# Patient Record
Sex: FEMALE | Race: WHITE | Employment: OTHER | URBAN - METROPOLITAN AREA
[De-identification: names, ages, dates, MRNs, and addresses within clinical notes are randomized per-mention and may not be internally consistent; named-entity substitution may affect disease eponyms.]

---

## 2020-12-03 ENCOUNTER — OFFICE VISIT (OUTPATIENT)
Dept: UROGYNECOLOGY | Age: 70
End: 2020-12-03
Payer: MEDICARE

## 2020-12-03 VITALS
OXYGEN SATURATION: 97 % | DIASTOLIC BLOOD PRESSURE: 84 MMHG | HEART RATE: 73 BPM | SYSTOLIC BLOOD PRESSURE: 149 MMHG | TEMPERATURE: 97 F | RESPIRATION RATE: 16 BRPM

## 2020-12-03 LAB
BILIRUBIN, POC: NORMAL
BLOOD URINE, POC: NORMAL
CLARITY, POC: CLEAR
COLOR, POC: YELLOW
EMPTY COUGH STRESS TEST: NORMAL
FIRST SENSATION: 90 CC
FULL COUGH STRESS TEST: NORMAL
GLUCOSE URINE, POC: NORMAL
KETONES, POC: NORMAL
LEUKOCYTE EST, POC: NORMAL
MAX SENSATION: 190 CC
NITRATE, URINE POC: NORMAL
NITRITE, POC: NORMAL
PH, POC: 6.5
POST VOID RESIDUAL (PVR): 10 ML
PROTEIN, POC: NORMAL
RBC URINE, POC: NORMAL
SECOND SENSATION: 130 CC
SPASM: NORMAL
SPECIFIC GRAVITY, POC: 1.01
UROBILINOGEN, POC: NORMAL
WBC URINE, POC: NORMAL

## 2020-12-03 PROCEDURE — 99204 OFFICE O/P NEW MOD 45 MIN: CPT | Performed by: OBSTETRICS & GYNECOLOGY

## 2020-12-03 PROCEDURE — G8427 DOCREV CUR MEDS BY ELIG CLIN: HCPCS | Performed by: OBSTETRICS & GYNECOLOGY

## 2020-12-03 PROCEDURE — G8484 FLU IMMUNIZE NO ADMIN: HCPCS | Performed by: OBSTETRICS & GYNECOLOGY

## 2020-12-03 PROCEDURE — 51725 SIMPLE CYSTOMETROGRAM: CPT | Performed by: OBSTETRICS & GYNECOLOGY

## 2020-12-03 PROCEDURE — 1090F PRES/ABSN URINE INCON ASSESS: CPT | Performed by: OBSTETRICS & GYNECOLOGY

## 2020-12-03 PROCEDURE — G8421 BMI NOT CALCULATED: HCPCS | Performed by: OBSTETRICS & GYNECOLOGY

## 2020-12-03 PROCEDURE — 81002 URINALYSIS NONAUTO W/O SCOPE: CPT | Performed by: OBSTETRICS & GYNECOLOGY

## 2020-12-03 RX ORDER — LEVOTHYROXINE SODIUM 88 UG/1
88 TABLET ORAL DAILY
COMMUNITY
Start: 2020-10-05

## 2020-12-03 ASSESSMENT — ENCOUNTER SYMPTOMS
DIARRHEA: 1
CONSTIPATION: 1
ABDOMINAL DISTENTION: 1
EYE ITCHING: 1
PHOTOPHOBIA: 1
ABDOMINAL PAIN: 1

## 2020-12-03 NOTE — PROGRESS NOTES
12/3/2020      HPI:     Name: Kimi Perea  YOB: 1950    CC: Patient is a 65 y.o. female who is seen in consultation from Salvador East MD   for evaluation of Rectocele and vaginal Prolapse. HPI:  She has pelvic organ prolapse that extends outside the opening of the vagina. She previously had a hysterectomy  Bladder control problem: yes, Only when she waits too long to urinate, loses urine when coughing, sneezing, running, or lifting. Bladder emptying problems: yes, 1 year. Thinks she has finished then has go back to bathroom in 5 minutes. Prolapse/Vaginal Support problems: yes, 8 years, occasionally has to push it back in herself. Bowel problem(s): yes,1 year, has accidental loss of gas and stool. Has to wear pads and change them several time a day. Sometimes has to manually assist with bowel movement. Sexual History:  reports being sexually active and has had partner(s) who are Male. Pelvic Pain:  yes, 1-2 years in all pelvic and rectal areas, sometimes walking hellps with pain. Ob/Gyn History:    OB History   No obstetric history on file. Past Medical History:   Past Medical History:   Diagnosis Date    Cancer [facility])     removal of a cancerous node from left thigh    Thyroid disease     underactive     Past Surgical History:   Past Surgical History:   Procedure Laterality Date    APPENDECTOMY      BREAST SURGERY      cyst benign    CHOLECYSTECTOMY      HYSTERECTOMY  1990    fibroids, excessive bleeding    OVARY REMOVAL      TONSILLECTOMY  1954    TUBAL LIGATION       Allergies:    Allergies   Allergen Reactions    Ceclor [Cefaclor]     Sulfa Antibiotics      Current Medications:  Current Outpatient Medications   Medication Sig Dispense Refill    levothyroxine (SYNTHROID) 88 MCG tablet       NONFORMULARY Revver capsule once a day      conjugated estrogens (PREMARIN) 0.625 MG/GM vaginal cream Apply pea size amount of cream with index finger to the vagina two times per week 42.5 g 0     No current facility-administered medications for this visit. Social History:   Social History     Socioeconomic History    Marital status:      Spouse name: Not on file    Number of children: Not on file    Years of education: Not on file    Highest education level: Not on file   Occupational History    Occupation: retired     Comment: educator   Social Needs    Financial resource strain: Not on file    Food insecurity     Worry: Not on file     Inability: Not on file   [employer] needs     Medical: Not on file     Non-medical: Not on file   Tobacco Use    Smoking status: Never Smoker    Smokeless tobacco: Never Used   Substance and Sexual Activity    Alcohol use: Yes     Comment: occasionally    Drug use: Never    Sexual activity: Yes     Partners: Male   Lifestyle    Physical activity     Days per week: Not on file     Minutes per session: Not on file    Stress: Not on file   Relationships    Social connections     Talks on phone: Not on file     Gets together: Not on file     Attends Advent service: Not on file     Active member of club or organization: Not on file     Attends meetings of clubs or organizations: Not on file     Relationship status: Not on file    Intimate partner violence     Fear of current or ex partner: Not on file     Emotionally abused: Not on file     Physically abused: Not on file     Forced sexual activity: Not on file   Other Topics Concern    Not on file   Social History Narrative    Not on file     Family History:   Family History   Problem Relation Age of Onset    Heart Failure Father     Diabetes Father     Cancer Maternal Aunt         gones    Breast Cancer Maternal Aunt     Diabetes Maternal Great Grandmother     Other Sister         posterior cortical atrophy similar to alzheimer's     Review of System  Review of Systems   HENT: Positive for dental problem, ear pain and tinnitus.     Eyes: Positive for photophobia and itching. Light sensitivity   Gastrointestinal: Positive for abdominal distention, abdominal pain, constipation and diarrhea. Genitourinary: Positive for dyspareunia and vaginal pain. Musculoskeletal: Positive for neck stiffness. Allergic/Immunologic: Positive for environmental allergies. Psychiatric/Behavioral: Positive for sleep disturbance. All other systems reviewed and are negative. A review of systems was done by the patient and reviewed by me and scanned into media today. Objective:     Vital Signs  Vitals:    12/03/20 0824   BP: (!) 149/84   Pulse: 73   Resp: 16   Temp: 97 °F (36.1 °C)   SpO2: 97%     Physical Exam  Physical Exam  HENT:      Head: Normocephalic and atraumatic. Eyes:      Conjunctiva/sclera: Conjunctivae normal.   Neck:      Musculoskeletal: Normal range of motion and neck supple. Pulmonary:      Effort: Pulmonary effort is normal.   Abdominal:      Palpations: Abdomen is soft. Genitourinary:     Comments: She has a large rectocele, vaginal atrophy, evidence of an old retropubic procedure  Skin:     General: Skin is warm and dry. Neurological:      Mental Status: She is alert and oriented to person, place, and time. Results for POC orders placed in visit on 12/03/20   POCT Urinalysis no Micro   Result Value Ref Range    Color, UA yellow     Clarity, UA clear     Glucose, UA POC neg     Bilirubin, UA neg     Ketones, UA neg     Spec Grav, UA 1.015     Blood, UA POC neg     pH, UA 6.5     Protein, UA POC neg     Urobilinogen, UA neg     Leukocytes, UA neg     Nitrite, UA neg        Office Fill Study/Urine Dip:     Using sterile technique a manometry catheter was placed. Patient's bladder was filled with sterile water by gravity. Capacity and storage volumes were measured. Spasms assessed. Catheter was removed. Stress urinary incontinence was assessed.     Results for POC orders placed in visit on 12/03/20   POCT Urinalysis no Micro   Result Value Ref Range    Color, UA yellow     Clarity, UA clear     Glucose, UA POC neg     Bilirubin, UA neg     Ketones, UA neg     Spec Grav, UA 1.015     Blood, UA POC neg     pH, UA 6.5     Protein, UA POC neg     Urobilinogen, UA neg     Leukocytes, UA neg     Nitrite, UA neg        Cystometrogram   wbc urine, poc   Date Value Ref Range Status   12/03/2020 neg  Final     rbc urine, poc   Date Value Ref Range Status   12/03/2020 neg  Final     Nitrate, UA POC   Date Value Ref Range Status   12/03/2020 neg  Final     post void residual   Date Value Ref Range Status   12/03/2020 10 ml Final     FIRST SENSATION   Date Value Ref Range Status   12/03/2020 90 cc Final     SECOND SENSATION   Date Value Ref Range Status   12/03/2020 130 cc Final     EMPTY COUGH STRESS TEST   Date Value Ref Range Status   12/03/2020 neg  Final     FULL COUGH STRESS TEST   Date Value Ref Range Status   12/03/2020 neg  Final     SPASM   Date Value Ref Range Status   12/03/2020 neg  Final        POPQ and Pelvic Exam:     Pelvic Organ Prolapse Quantification  Anterior Wall (Aa): 3   Anterior Wall (Ba): 3   Cervix or Cuff (C): -5     Genital Hiatus (gh): 3   Perineal Body (pb): 3   Total Vaginal Length (tvl): 8     Posterior Wall (Ap): +1   Posterior Wall (Bp): +1   No data recorded   Oxford Scale Muscle Strength: 0       Assessment/Plan:     Paula Willis is a 65 y.o. female with   1. Dysuria    2. Cystocele, midline    3. Rectocele    4. Enterocele    5. Vaginal vault prolapse    6. Vaginal atrophy    7. Overactive bladder    She has agreed to try estrogen vaginal cream for her vaginal atrophy. She has also agreed to return for cystourethroscopy and surgical consent. Her exam feels as though she had a previous retropubic procedure as she has a foreshortened anterior segment and a large posterior vaginal wall defect with a rectocele and enterocele. I think this will be best approached vaginally.   She was also given handouts on all of this as well as overactive bladder. We agreed to fix her prolapse first and if her bladder continues to bother her with urgency and frequency with we would work on this with anticholinergic medications and physical therapy.     Orders Placed This Encounter   Procedures    POCT Urinalysis no Micro    Cystometrogram     Orders Placed This Encounter   Medications    conjugated estrogens (PREMARIN) 0.625 MG/GM vaginal cream     Sig: Apply pea size amount of cream with index finger to the vagina two times per week     Dispense:  42.5 g     Refill:  0       Grace Gee

## 2021-01-06 ENCOUNTER — PROCEDURE VISIT (OUTPATIENT)
Dept: UROGYNECOLOGY | Age: 71
End: 2021-01-06

## 2021-01-06 VITALS
DIASTOLIC BLOOD PRESSURE: 80 MMHG | SYSTOLIC BLOOD PRESSURE: 140 MMHG | HEART RATE: 72 BPM | RESPIRATION RATE: 20 BRPM | OXYGEN SATURATION: 98 % | TEMPERATURE: 97.3 F

## 2021-01-06 DIAGNOSIS — N81.6 RECTOCELE: ICD-10-CM

## 2021-01-06 DIAGNOSIS — N32.81 OVERACTIVE BLADDER: ICD-10-CM

## 2021-01-06 DIAGNOSIS — K46.9 ENTEROCELE: ICD-10-CM

## 2021-01-06 DIAGNOSIS — N95.2 VAGINAL ATROPHY: ICD-10-CM

## 2021-01-06 DIAGNOSIS — N81.9 VAGINAL VAULT PROLAPSE: ICD-10-CM

## 2021-01-06 DIAGNOSIS — R30.0 DYSURIA: Primary | ICD-10-CM

## 2021-01-06 DIAGNOSIS — N81.11 CYSTOCELE, MIDLINE: ICD-10-CM

## 2021-01-06 RX ORDER — SODIUM CHLORIDE 0.9 % (FLUSH) 0.9 %
10 SYRINGE (ML) INJECTION PRN
Status: CANCELLED | OUTPATIENT
Start: 2021-01-06

## 2021-01-06 RX ORDER — SODIUM CHLORIDE 0.9 % (FLUSH) 0.9 %
10 SYRINGE (ML) INJECTION EVERY 12 HOURS SCHEDULED
Status: CANCELLED | OUTPATIENT
Start: 2021-01-06

## 2021-01-06 RX ORDER — CIPROFLOXACIN 2 MG/ML
400 INJECTION, SOLUTION INTRAVENOUS
Status: CANCELLED | OUTPATIENT
Start: 2021-01-06 | End: 2021-01-06

## 2021-01-06 ASSESSMENT — ENCOUNTER SYMPTOMS
ABDOMINAL DISTENTION: 1
DIARRHEA: 1

## 2021-01-06 NOTE — PROGRESS NOTES
1/6/2021     HPI:     Name: Gisella Palm  YOB: 1950    CC: Gisella Palm is a 65 y.o. female presenting for an evaluation of prolapse and voiding dysfunction. HPI: How long have you had this problem? 1-2 years  Please rate the severity of your problem: moderately severe  Anything make it better? Nothing really helps. Ob/Gyn History:    OB History   No obstetric history on file. Past Medical History:   Past Medical History:   Diagnosis Date    Cancer [facility])     removal of a cancerous node from left thigh    Thyroid disease     underactive     Past Surgical History:   Past Surgical History:   Procedure Laterality Date    APPENDECTOMY      BREAST SURGERY      cyst benign    CHOLECYSTECTOMY      HYSTERECTOMY  1990    fibroids, excessive bleeding    OVARY REMOVAL      TONSILLECTOMY  1954    TUBAL LIGATION       Current Medications:  Current Outpatient Medications   Medication Sig Dispense Refill    levothyroxine (SYNTHROID) 88 MCG tablet       NONFORMULARY Brown colon health capsule once a day      conjugated estrogens (PREMARIN) 0.625 MG/GM vaginal cream Apply pea size amount of cream with index finger to the vagina two times per week 42.5 g 0     No current facility-administered medications for this visit. Allergies:    Allergies   Allergen Reactions    Ceclor [Cefaclor]     Sulfa Antibiotics      Social History:   Social History     Socioeconomic History    Marital status:      Spouse name: Not on file    Number of children: Not on file    Years of education: Not on file    Highest education level: Not on file   Occupational History    Occupation: retired     Comment: educator   Social Needs    Financial resource strain: Not on file    Food insecurity     Worry: Not on file     Inability: Not on file   [employer] needs     Medical: Not on file     Non-medical: Not on file   Tobacco Use    Smoking status: Never Smoker    Smokeless tobacco: Never Used Substance and Sexual Activity    Alcohol use: Yes     Comment: occasionally    Drug use: Never    Sexual activity: Yes     Partners: Male   Lifestyle    Physical activity     Days per week: Not on file     Minutes per session: Not on file    Stress: Not on file   Relationships    Social connections     Talks on phone: Not on file     Gets together: Not on file     Attends Jehovah's witness service: Not on file     Active member of club or organization: Not on file     Attends meetings of clubs or organizations: Not on file     Relationship status: Not on file    Intimate partner violence     Fear of current or ex partner: Not on file     Emotionally abused: Not on file     Physically abused: Not on file     Forced sexual activity: Not on file   Other Topics Concern    Not on file   Social History Narrative    Not on file     Family History:   Family History   Problem Relation Age of Onset    Heart Failure Father     Diabetes Father     Cancer Maternal Aunt         gones    Breast Cancer Maternal Aunt     Diabetes Maternal Great Grandmother     Other Sister         posterior cortical atrophy similar to alzheimer's     Review of System  Review of Systems   HENT: Positive for tinnitus. Gastrointestinal: Positive for abdominal distention and diarrhea. Genitourinary: Positive for difficulty urinating and vaginal pain. Allergic/Immunologic: Positive for environmental allergies. All other systems reviewed and are negative. A review of systems was done by the patient and reviewed by me and scanned into media today. Objective:     Vital Signs  Vitals:    01/06/21 0915   BP: (!) 140/80   Pulse: 72   Resp: 20   Temp: 97.3 °F (36.3 °C)   SpO2: 98%      Physical Exam  Physical Exam  HENT:      Head: Normocephalic and atraumatic. Eyes:      Conjunctiva/sclera: Conjunctivae normal.   Neck:      Musculoskeletal: Normal range of motion and neck supple.    Pulmonary:      Effort: Pulmonary effort is normal. placed in this encounter.       Jewel Seth

## 2021-03-09 ENCOUNTER — OFFICE VISIT (OUTPATIENT)
Dept: PRIMARY CARE CLINIC | Age: 71
End: 2021-03-09
Payer: MEDICARE

## 2021-03-09 DIAGNOSIS — Z20.828 EXPOSURE TO SARS-ASSOCIATED CORONAVIRUS: Primary | ICD-10-CM

## 2021-03-09 PROCEDURE — G8428 CUR MEDS NOT DOCUMENT: HCPCS | Performed by: NURSE PRACTITIONER

## 2021-03-09 PROCEDURE — 99211 OFF/OP EST MAY X REQ PHY/QHP: CPT | Performed by: NURSE PRACTITIONER

## 2021-03-09 PROCEDURE — G8420 CALC BMI NORM PARAMETERS: HCPCS | Performed by: NURSE PRACTITIONER

## 2021-03-09 RX ORDER — CHOLECALCIFEROL (VITAMIN D3) 125 MCG
500 CAPSULE ORAL DAILY
COMMUNITY

## 2021-03-09 NOTE — PROGRESS NOTES
Preoperative Screening for Elective Surgery/Invasive Procedures While COVID-19 present in the community     Have you tested positive or have been told to self-isolate for COVID-19 like symptoms within the past 28 days? NO   Do you currently have any of the following symptoms? NO  o Fever >100.0 F or 99.9 F in immunocompromised patients? NO  o New onset cough, shortness of breath or difficulty breathing? NO  o New onset sore throat, myalgia (muscle aches and pains), headache, loss of taste/smell or diarrhea? NO   Have you had a potential exposure to COVID-19 within the past 14 days by: NO  o Close contact with a confirmed case? NO  o Close contact with a healthcare worker,  or essential infrastructure worker (grocery store, [employer], gas station, public utilities or transportation)? YES- MD OFFICE  o Do you reside in a congregate setting such as; skilled nursing facility, adult home, correctional facility, homeless shelter or other institutional setting? NO  o Have you had recent travel to a known COVID-19 hotspot? YES-[place]    Indicate if the patient has a positive screen by answering yes to one or more of the above questions. Patients who test positive or screen positive prior to surgery or on the day of surgery should be evaluated in conjunction with the surgeon/proceduralist/anesthesiologist to determine the urgency of the procedure.

## 2021-03-09 NOTE — PROGRESS NOTES
[address] [facility] time___0600_________        Surgery time______0730______    Take the following medications with a sip of water: Follow your MD/Surgeons pre-procedure instructions regarding your medications    Do not eat or drink anything after 12:00 midnight prior to your surgery. This includes water chewing gum, mints and ice chips. You may brush your teeth and gargle the morning of your surgery, but do not swallow the water     Please see your family doctor/pediatrician for a history and physical and/or concerning medications. Bring any test results/reports from your physicians office. If you are under the care of a heart doctor or specialist doctor, please be aware that you may be asked to them for clearance    You may be asked to stop blood thinners such as Coumadin, Plavix, Fragmin, Lovenox, etc., or any anti-inflammatories such as:  Aspirin, Ibuprofen, Advil, Naproxen prior to your surgery. We also ask that you stop any OTC medications such as fish oil, vitamin E, glucosamine, garlic, Multivitamins, COQ 10, etc. May take tylenol    We ask that you do not smoke 24 hours prior to surgery  We ask that you do not  drink any alcoholic beverages 24 hours prior to surgery     You must make arrangements for a responsible adult to take you home after your surgery. For your safety you will not be allowed to leave alone or drive yourself home. Your surgery will be cancelled if you do not have a ride home. Also for your safety, it is strongly suggested that someone stay with you the first 24 hours after your surgery. A parent or legal guardian must accompany a child scheduled for surgery and plan to stay at the hospital until the child is discharged. Please do not bring other children with you. For your comfort, please wear simple loose fitting clothing to the hospital.  Please do not bring valuables.     Do not wear any make-up or nail polish on your fingers or toes      For your safety, please do not wear any jewelry or body piercing's on the day of surgery. All jewelry must be removed. If you have dentures, they will be removed before going to operating room. For your convenience, we will provide you with a container. If you wear contact lenses or glasses, they will be removed, please bring a case for them. If you have a living will and a durable power of  for healthcare, please bring in a copy. As part of our patient safety program to minimize surgical site infections, we ask you to do the following:    · Please notify your surgeon if you develop any illness between         now and the  day of your surgery. · This includes a cough, cold, fever, sore throat, nausea,         or vomiting, and diarrhea, etc.  ·  Please notify your surgeon if you experience dizziness, shortness         of breath or blurred vision between now and the time of your surgery. Do not shave your operative site 96 hours prior to surgery. For face and neck surgery, men may use an electric razor 48 hours   prior to surgery. You may shower the night before surgery or the morning of   your surgery with an antibacterial soap. You will need to bring a photo ID and insurance card    [facility] has an onsite pharmacy, would you like to utilize our pharmacy     If you will be staying overnight and use a C-pap machine, please bring   your C-pap to hospital     Our goal is to provide you with excellent care, therefore, visitors will be limited to two(2) in the room at a time so that we may focus on providing this care for you. Please contact pre-admission testing if you have any further questions.                  [facility] phone number:  [phone] PAT fax number:  [phone]  Please note these are generalized instructions for all surgical cases, you may be provided with more specific instructions according to your surgery. Remember. [facility] Safety First! Call before you Fall Remember

## 2021-03-10 LAB — SARS-COV-2: NOT DETECTED

## 2021-03-11 ENCOUNTER — TELEPHONE (OUTPATIENT)
Dept: UROGYNECOLOGY | Age: 71
End: 2021-03-11

## 2021-03-11 NOTE — TELEPHONE ENCOUNTER
Surgery scheduled for 3/15/21. Called patient and allowed time for any additional questions prior to surgery. Pt asked if she needed to have a bowel prep prior to surgery-no bowel prep needed. Pt educated to hold synthroid morning of surgery. Reviewed post surgery care with pt. Pt denies any further questions.

## 2021-03-11 NOTE — PROGRESS NOTES
Sunny Vinh received a viral test for COVID-19. They were educated on isolation and quarantine as appropriate. For any symptoms, they were directed to seek care from their PCP, given contact information to establish with a doctor, directed to an urgent care or the emergency room.

## 2021-03-12 ENCOUNTER — TELEPHONE (OUTPATIENT)
Dept: UROGYNECOLOGY | Age: 71
End: 2021-03-12

## 2021-03-12 ENCOUNTER — ANESTHESIA EVENT (OUTPATIENT)
Dept: OPERATING ROOM | Age: 71
End: 2021-03-12
Payer: MEDICARE

## 2021-03-12 PROBLEM — M19.042 OSTEOARTHRITIS OF BOTH HANDS: Status: ACTIVE | Noted: 2021-03-12

## 2021-03-12 PROBLEM — M19.041 OSTEOARTHRITIS OF BOTH HANDS: Status: ACTIVE | Noted: 2021-03-12

## 2021-03-12 PROBLEM — E03.9 HYPOTHYROIDISM: Status: ACTIVE | Noted: 2021-03-12

## 2021-03-12 PROBLEM — Z78.0 POSTMENOPAUSE: Status: ACTIVE | Noted: 2021-03-12

## 2021-03-12 PROBLEM — N81.6 HERNIATION OF RECTUM INTO VAGINA: Status: ACTIVE | Noted: 2021-03-12

## 2021-03-12 PROBLEM — D05.80 PAPILLARY CARCINOMA IN SITU OF BREAST: Status: ACTIVE | Noted: 2021-03-12

## 2021-03-12 PROBLEM — B35.1 ONYCHOMYCOSIS: Status: ACTIVE | Noted: 2021-03-12

## 2021-03-12 PROBLEM — E78.5 HYPERLIPIDEMIA: Status: ACTIVE | Noted: 2021-03-12

## 2021-03-12 PROBLEM — M85.80 OSTEOPENIA: Status: ACTIVE | Noted: 2021-03-12

## 2021-03-12 PROBLEM — L98.9 INFLAMMATORY DERMATOSIS: Status: ACTIVE | Noted: 2021-03-12

## 2021-03-15 ENCOUNTER — ANESTHESIA (OUTPATIENT)
Dept: OPERATING ROOM | Age: 71
End: 2021-03-15
Payer: MEDICARE

## 2021-03-15 ENCOUNTER — HOSPITAL ENCOUNTER (OUTPATIENT)
Age: 71
Setting detail: OUTPATIENT SURGERY
Discharge: HOME | End: 2021-03-15
Attending: OBSTETRICS & GYNECOLOGY | Admitting: OBSTETRICS & GYNECOLOGY
Payer: MEDICARE

## 2021-03-15 VITALS
OXYGEN SATURATION: 98 % | DIASTOLIC BLOOD PRESSURE: 87 MMHG | RESPIRATION RATE: 4 BRPM | SYSTOLIC BLOOD PRESSURE: 141 MMHG | TEMPERATURE: 96.4 F

## 2021-03-15 VITALS
WEIGHT: 134.7 LBS | OXYGEN SATURATION: 95 % | TEMPERATURE: 97.6 F | HEART RATE: 64 BPM | HEIGHT: 67 IN | BODY MASS INDEX: 21.14 KG/M2 | RESPIRATION RATE: 16 BRPM | SYSTOLIC BLOOD PRESSURE: 162 MMHG | DIASTOLIC BLOOD PRESSURE: 82 MMHG

## 2021-03-15 DIAGNOSIS — G89.18 POSTOPERATIVE PAIN: Primary | ICD-10-CM

## 2021-03-15 LAB
HCT VFR BLD CALC: 39.5 % (ref 36–48)
HEMOGLOBIN: 13.2 G/DL (ref 12–16)
MCH RBC QN AUTO: 29.4 PG (ref 26–34)
MCHC RBC AUTO-ENTMCNC: 33.5 G/DL (ref 31–36)
MCV RBC AUTO: 87.6 FL (ref 80–100)
PDW BLD-RTO: 13.5 % (ref 12.4–15.4)
PLATELET # BLD: 312 K/UL (ref 135–450)
PMV BLD AUTO: 7.4 FL (ref 5–10.5)
RBC # BLD: 4.51 M/UL (ref 4–5.2)
WBC # BLD: 5.3 K/UL (ref 4–11)

## 2021-03-15 PROCEDURE — 6360000002 HC RX W HCPCS: Performed by: NURSE ANESTHETIST, CERTIFIED REGISTERED

## 2021-03-15 PROCEDURE — 2500000003 HC RX 250 WO HCPCS: Performed by: OBSTETRICS & GYNECOLOGY

## 2021-03-15 PROCEDURE — 3700000001 HC ADD 15 MINUTES (ANESTHESIA): Performed by: OBSTETRICS & GYNECOLOGY

## 2021-03-15 PROCEDURE — 7100000011 HC PHASE II RECOVERY - ADDTL 15 MIN: Performed by: OBSTETRICS & GYNECOLOGY

## 2021-03-15 PROCEDURE — 2709999900 HC NON-CHARGEABLE SUPPLY: Performed by: OBSTETRICS & GYNECOLOGY

## 2021-03-15 PROCEDURE — 7100000010 HC PHASE II RECOVERY - FIRST 15 MIN: Performed by: OBSTETRICS & GYNECOLOGY

## 2021-03-15 PROCEDURE — 7100000001 HC PACU RECOVERY - ADDTL 15 MIN: Performed by: OBSTETRICS & GYNECOLOGY

## 2021-03-15 PROCEDURE — C9290 INJ, BUPIVACAINE LIPOSOME: HCPCS | Performed by: OBSTETRICS & GYNECOLOGY

## 2021-03-15 PROCEDURE — 6370000000 HC RX 637 (ALT 250 FOR IP): Performed by: ANESTHESIOLOGY

## 2021-03-15 PROCEDURE — 7100000000 HC PACU RECOVERY - FIRST 15 MIN: Performed by: OBSTETRICS & GYNECOLOGY

## 2021-03-15 PROCEDURE — 57285 REPAIR PARAVAG DEFECT VAG: CPT | Performed by: OBSTETRICS & GYNECOLOGY

## 2021-03-15 PROCEDURE — 2500000003 HC RX 250 WO HCPCS: Performed by: NURSE ANESTHETIST, CERTIFIED REGISTERED

## 2021-03-15 PROCEDURE — 6360000002 HC RX W HCPCS: Performed by: OBSTETRICS & GYNECOLOGY

## 2021-03-15 PROCEDURE — 3600000004 HC SURGERY LEVEL 4 BASE: Performed by: OBSTETRICS & GYNECOLOGY

## 2021-03-15 PROCEDURE — 85027 COMPLETE CBC AUTOMATED: CPT

## 2021-03-15 PROCEDURE — 2580000003 HC RX 258: Performed by: OBSTETRICS & GYNECOLOGY

## 2021-03-15 PROCEDURE — 57250 REPAIR RECTUM & VAGINA: CPT | Performed by: OBSTETRICS & GYNECOLOGY

## 2021-03-15 PROCEDURE — 2580000003 HC RX 258: Performed by: ANESTHESIOLOGY

## 2021-03-15 PROCEDURE — 3700000000 HC ANESTHESIA ATTENDED CARE: Performed by: OBSTETRICS & GYNECOLOGY

## 2021-03-15 PROCEDURE — 57283 COLPOPEXY INTRAPERITONEAL: CPT | Performed by: OBSTETRICS & GYNECOLOGY

## 2021-03-15 PROCEDURE — 3600000014 HC SURGERY LEVEL 4 ADDTL 15MIN: Performed by: OBSTETRICS & GYNECOLOGY

## 2021-03-15 RX ORDER — IBUPROFEN 600 MG/1
600 TABLET ORAL 3 TIMES DAILY
Qty: 30 TABLET | Refills: 1 | Status: SHIPPED | OUTPATIENT
Start: 2021-03-15 | End: 2021-04-28 | Stop reason: ALTCHOICE

## 2021-03-15 RX ORDER — OXYCODONE HYDROCHLORIDE AND ACETAMINOPHEN 5; 325 MG/1; MG/1
1 TABLET ORAL EVERY 6 HOURS PRN
Qty: 20 TABLET | Refills: 0 | Status: SHIPPED | OUTPATIENT
Start: 2021-03-15 | End: 2021-03-20

## 2021-03-15 RX ORDER — POLYETHYLENE GLYCOL 3350 17 G/17G
17 POWDER ORAL DAILY
Qty: 850 G | Refills: 1 | Status: SHIPPED | OUTPATIENT
Start: 2021-03-15 | End: 2021-04-14

## 2021-03-15 RX ORDER — LIDOCAINE HYDROCHLORIDE 20 MG/ML
INJECTION, SOLUTION EPIDURAL; INFILTRATION; INTRACAUDAL; PERINEURAL PRN
Status: DISCONTINUED | OUTPATIENT
Start: 2021-03-15 | End: 2021-03-15 | Stop reason: SDUPTHER

## 2021-03-15 RX ORDER — ROCURONIUM BROMIDE 10 MG/ML
INJECTION, SOLUTION INTRAVENOUS PRN
Status: DISCONTINUED | OUTPATIENT
Start: 2021-03-15 | End: 2021-03-15 | Stop reason: SDUPTHER

## 2021-03-15 RX ORDER — SODIUM CHLORIDE 9 MG/ML
INJECTION, SOLUTION INTRAVENOUS CONTINUOUS
Status: DISCONTINUED | OUTPATIENT
Start: 2021-03-15 | End: 2021-03-15 | Stop reason: HOSPADM

## 2021-03-15 RX ORDER — SODIUM CHLORIDE 0.9 % (FLUSH) 0.9 %
10 SYRINGE (ML) INJECTION PRN
Status: DISCONTINUED | OUTPATIENT
Start: 2021-03-15 | End: 2021-03-15 | Stop reason: SDUPTHER

## 2021-03-15 RX ORDER — FENTANYL CITRATE 50 UG/ML
INJECTION, SOLUTION INTRAMUSCULAR; INTRAVENOUS PRN
Status: DISCONTINUED | OUTPATIENT
Start: 2021-03-15 | End: 2021-03-15 | Stop reason: SDUPTHER

## 2021-03-15 RX ORDER — FENTANYL CITRATE 50 UG/ML
50 INJECTION, SOLUTION INTRAMUSCULAR; INTRAVENOUS EVERY 5 MIN PRN
Status: DISCONTINUED | OUTPATIENT
Start: 2021-03-15 | End: 2021-03-15 | Stop reason: HOSPADM

## 2021-03-15 RX ORDER — PHENYLEPHRINE HCL IN 0.9% NACL 1 MG/10 ML
SYRINGE (ML) INTRAVENOUS PRN
Status: DISCONTINUED | OUTPATIENT
Start: 2021-03-15 | End: 2021-03-15

## 2021-03-15 RX ORDER — OXYCODONE HYDROCHLORIDE 10 MG/1
10 TABLET ORAL PRN
Status: COMPLETED | OUTPATIENT
Start: 2021-03-15 | End: 2021-03-15

## 2021-03-15 RX ORDER — PHENYLEPHRINE HCL IN 0.9% NACL 1 MG/10 ML
SYRINGE (ML) INTRAVENOUS PRN
Status: DISCONTINUED | OUTPATIENT
Start: 2021-03-15 | End: 2021-03-15 | Stop reason: SDUPTHER

## 2021-03-15 RX ORDER — PROPOFOL 10 MG/ML
INJECTION, EMULSION INTRAVENOUS PRN
Status: DISCONTINUED | OUTPATIENT
Start: 2021-03-15 | End: 2021-03-15 | Stop reason: SDUPTHER

## 2021-03-15 RX ORDER — MORPHINE SULFATE 2 MG/ML
1 INJECTION, SOLUTION INTRAMUSCULAR; INTRAVENOUS EVERY 5 MIN PRN
Status: DISCONTINUED | OUTPATIENT
Start: 2021-03-15 | End: 2021-03-15 | Stop reason: HOSPADM

## 2021-03-15 RX ORDER — MAGNESIUM HYDROXIDE 1200 MG/15ML
LIQUID ORAL CONTINUOUS PRN
Status: COMPLETED | OUTPATIENT
Start: 2021-03-15 | End: 2021-03-15

## 2021-03-15 RX ORDER — SODIUM CHLORIDE 0.9 % (FLUSH) 0.9 %
10 SYRINGE (ML) INJECTION EVERY 12 HOURS SCHEDULED
Status: DISCONTINUED | OUTPATIENT
Start: 2021-03-15 | End: 2021-03-15 | Stop reason: SDUPTHER

## 2021-03-15 RX ORDER — MORPHINE SULFATE 2 MG/ML
2 INJECTION, SOLUTION INTRAMUSCULAR; INTRAVENOUS EVERY 5 MIN PRN
Status: DISCONTINUED | OUTPATIENT
Start: 2021-03-15 | End: 2021-03-15 | Stop reason: HOSPADM

## 2021-03-15 RX ORDER — KETOROLAC TROMETHAMINE 30 MG/ML
INJECTION, SOLUTION INTRAMUSCULAR; INTRAVENOUS PRN
Status: DISCONTINUED | OUTPATIENT
Start: 2021-03-15 | End: 2021-03-15 | Stop reason: SDUPTHER

## 2021-03-15 RX ORDER — DEXAMETHASONE SODIUM PHOSPHATE 4 MG/ML
INJECTION, SOLUTION INTRA-ARTICULAR; INTRALESIONAL; INTRAMUSCULAR; INTRAVENOUS; SOFT TISSUE PRN
Status: DISCONTINUED | OUTPATIENT
Start: 2021-03-15 | End: 2021-03-15 | Stop reason: SDUPTHER

## 2021-03-15 RX ORDER — SODIUM CHLORIDE 0.9 % (FLUSH) 0.9 %
10 SYRINGE (ML) INJECTION PRN
Status: DISCONTINUED | OUTPATIENT
Start: 2021-03-15 | End: 2021-03-15 | Stop reason: HOSPADM

## 2021-03-15 RX ORDER — MEPERIDINE HYDROCHLORIDE 25 MG/ML
12.5 INJECTION INTRAMUSCULAR; INTRAVENOUS; SUBCUTANEOUS EVERY 5 MIN PRN
Status: DISCONTINUED | OUTPATIENT
Start: 2021-03-15 | End: 2021-03-15 | Stop reason: HOSPADM

## 2021-03-15 RX ORDER — FENTANYL CITRATE 50 UG/ML
25 INJECTION, SOLUTION INTRAMUSCULAR; INTRAVENOUS EVERY 5 MIN PRN
Status: DISCONTINUED | OUTPATIENT
Start: 2021-03-15 | End: 2021-03-15 | Stop reason: HOSPADM

## 2021-03-15 RX ORDER — ONDANSETRON 2 MG/ML
INJECTION INTRAMUSCULAR; INTRAVENOUS PRN
Status: DISCONTINUED | OUTPATIENT
Start: 2021-03-15 | End: 2021-03-15 | Stop reason: SDUPTHER

## 2021-03-15 RX ORDER — ONDANSETRON 2 MG/ML
4 INJECTION INTRAMUSCULAR; INTRAVENOUS
Status: DISCONTINUED | OUTPATIENT
Start: 2021-03-15 | End: 2021-03-15 | Stop reason: HOSPADM

## 2021-03-15 RX ORDER — OXYCODONE HYDROCHLORIDE 5 MG/1
5 TABLET ORAL PRN
Status: COMPLETED | OUTPATIENT
Start: 2021-03-15 | End: 2021-03-15

## 2021-03-15 RX ORDER — CIPROFLOXACIN 2 MG/ML
400 INJECTION, SOLUTION INTRAVENOUS
Status: COMPLETED | OUTPATIENT
Start: 2021-03-15 | End: 2021-03-15

## 2021-03-15 RX ORDER — PSYLLIUM SEED (WITH DEXTROSE)
1 POWDER (GRAM) ORAL 2 TIMES DAILY
Qty: 538 G | Refills: 1 | Status: SHIPPED | OUTPATIENT
Start: 2021-03-15 | End: 2021-04-26

## 2021-03-15 RX ORDER — SODIUM CHLORIDE 0.9 % (FLUSH) 0.9 %
10 SYRINGE (ML) INJECTION EVERY 12 HOURS SCHEDULED
Status: DISCONTINUED | OUTPATIENT
Start: 2021-03-15 | End: 2021-03-15 | Stop reason: HOSPADM

## 2021-03-15 RX ADMIN — ROCURONIUM BROMIDE 40 MG: 10 INJECTION INTRAVENOUS at 07:32

## 2021-03-15 RX ADMIN — CIPROFLOXACIN 400 MG: 2 INJECTION, SOLUTION INTRAVENOUS at 07:05

## 2021-03-15 RX ADMIN — FENTANYL CITRATE 50 MCG: 50 INJECTION INTRAMUSCULAR; INTRAVENOUS at 07:32

## 2021-03-15 RX ADMIN — DEXAMETHASONE SODIUM PHOSPHATE 8 MG: 4 INJECTION, SOLUTION INTRAMUSCULAR; INTRAVENOUS at 07:38

## 2021-03-15 RX ADMIN — Medication 100 MCG: at 09:15

## 2021-03-15 RX ADMIN — SODIUM CHLORIDE: 9 INJECTION, SOLUTION INTRAVENOUS at 07:05

## 2021-03-15 RX ADMIN — HYDROMORPHONE HYDROCHLORIDE 1 MG: 1 INJECTION, SOLUTION INTRAMUSCULAR; INTRAVENOUS; SUBCUTANEOUS at 08:16

## 2021-03-15 RX ADMIN — PROPOFOL 50 MG: 10 INJECTION, EMULSION INTRAVENOUS at 09:07

## 2021-03-15 RX ADMIN — ONDANSETRON 4 MG: 2 INJECTION INTRAMUSCULAR; INTRAVENOUS at 07:22

## 2021-03-15 RX ADMIN — FAMOTIDINE 20 MG: 10 INJECTION, SOLUTION INTRAVENOUS at 07:22

## 2021-03-15 RX ADMIN — LIDOCAINE HYDROCHLORIDE 80 MG: 20 INJECTION, SOLUTION EPIDURAL; INFILTRATION; INTRACAUDAL; PERINEURAL at 07:32

## 2021-03-15 RX ADMIN — Medication 50 MCG: at 08:26

## 2021-03-15 RX ADMIN — METRONIDAZOLE 500 MG: 500 INJECTION, SOLUTION INTRAVENOUS at 07:37

## 2021-03-15 RX ADMIN — Medication 50 MCG: at 08:05

## 2021-03-15 RX ADMIN — FENTANYL CITRATE 50 MCG: 50 INJECTION INTRAMUSCULAR; INTRAVENOUS at 07:41

## 2021-03-15 RX ADMIN — KETOROLAC TROMETHAMINE 15 MG: 30 INJECTION, SOLUTION INTRAMUSCULAR at 09:26

## 2021-03-15 RX ADMIN — INDIGO CARMINE 40 MG: 8 INJECTION, SOLUTION INTRAMUSCULAR; INTRAVENOUS at 08:29

## 2021-03-15 RX ADMIN — SUGAMMADEX 200 MG: 100 INJECTION, SOLUTION INTRAVENOUS at 09:09

## 2021-03-15 RX ADMIN — Medication 100 MCG: at 07:50

## 2021-03-15 RX ADMIN — OXYCODONE 5 MG: 5 TABLET ORAL at 12:06

## 2021-03-15 RX ADMIN — PROPOFOL 150 MG: 10 INJECTION, EMULSION INTRAVENOUS at 07:32

## 2021-03-15 ASSESSMENT — PAIN SCALES - GENERAL
PAINLEVEL_OUTOF10: 5
PAINLEVEL_OUTOF10: 0
PAINLEVEL_OUTOF10: 2
PAINLEVEL_OUTOF10: 0

## 2021-03-15 ASSESSMENT — PULMONARY FUNCTION TESTS
PIF_VALUE: 6
PIF_VALUE: 14
PIF_VALUE: 15
PIF_VALUE: 11
PIF_VALUE: 14
PIF_VALUE: 12
PIF_VALUE: 13
PIF_VALUE: 15
PIF_VALUE: 14
PIF_VALUE: 12
PIF_VALUE: 15
PIF_VALUE: 16
PIF_VALUE: 13
PIF_VALUE: 16
PIF_VALUE: 12
PIF_VALUE: 0
PIF_VALUE: 2
PIF_VALUE: 12
PIF_VALUE: 14
PIF_VALUE: 15
PIF_VALUE: 12
PIF_VALUE: 14
PIF_VALUE: 12
PIF_VALUE: 1
PIF_VALUE: 13
PIF_VALUE: 12
PIF_VALUE: 13
PIF_VALUE: 20
PIF_VALUE: 15
PIF_VALUE: 16
PIF_VALUE: 12
PIF_VALUE: 0
PIF_VALUE: 2
PIF_VALUE: 12
PIF_VALUE: 12
PIF_VALUE: 15
PIF_VALUE: 16
PIF_VALUE: 15
PIF_VALUE: 26
PIF_VALUE: 14
PIF_VALUE: 15
PIF_VALUE: 14
PIF_VALUE: 12
PIF_VALUE: 14
PIF_VALUE: 0
PIF_VALUE: 12
PIF_VALUE: 18
PIF_VALUE: 14
PIF_VALUE: 15
PIF_VALUE: 1
PIF_VALUE: 12
PIF_VALUE: 2
PIF_VALUE: 12
PIF_VALUE: 16
PIF_VALUE: 13
PIF_VALUE: 12
PIF_VALUE: 15
PIF_VALUE: 0
PIF_VALUE: 13
PIF_VALUE: 13
PIF_VALUE: 14
PIF_VALUE: 14
PIF_VALUE: 16
PIF_VALUE: 2
PIF_VALUE: 13
PIF_VALUE: 12
PIF_VALUE: 1
PIF_VALUE: 12
PIF_VALUE: 12
PIF_VALUE: 14

## 2021-03-15 ASSESSMENT — PAIN DESCRIPTION - PAIN TYPE: TYPE: SURGICAL PAIN

## 2021-03-15 ASSESSMENT — PAIN DESCRIPTION - DESCRIPTORS
DESCRIPTORS: PRESSURE
DESCRIPTORS: PRESSURE

## 2021-03-15 ASSESSMENT — PAIN DESCRIPTION - FREQUENCY: FREQUENCY: CONTINUOUS

## 2021-03-15 ASSESSMENT — PAIN DESCRIPTION - PROGRESSION: CLINICAL_PROGRESSION: GRADUALLY IMPROVING

## 2021-03-15 ASSESSMENT — PAIN - FUNCTIONAL ASSESSMENT
PAIN_FUNCTIONAL_ASSESSMENT: 0-10
PAIN_FUNCTIONAL_ASSESSMENT: PREVENTS OR INTERFERES SOME ACTIVE ACTIVITIES AND ADLS

## 2021-03-15 NOTE — PROGRESS NOTES
Patient admitted to PACU from OR. Patient asleep,opens eyes to name. Resp easy unlabored on 4LNC with SaO2 with SAO2 97%. Monitor in SR. Abdomen soft. Kassandra pad intact no drainage. Urinary catheter intact patent to gravity drainage with blue drainage in tubing. Patient moving all extremities to command. IV patent to left forearm. VSS. Patient denies C/O pain or nausea.

## 2021-03-15 NOTE — PROGRESS NOTES
Patient awake and alert. Resp easy unlabored on room air O2 with SaO2 100%. Monitor in SR. Abdomen soft. Urinary catheter intact patent to gravity drainage with clear blue urine in drainage bag. Kassandra pad intact with mesh pants on. Vaginal packing in place. Moving all extremities to command. Patient denies C/O nausea. Patient states surgical pressure at 2-3 of 10 and tolerable. VSS. IV patent. Patient stable to transfer to ACU for phase II.

## 2021-03-15 NOTE — PROGRESS NOTES
CLINICAL PHARMACY NOTE: MEDS TO [facility] MYTRND Select Patient?: No  Total # of Prescriptions Filled: 4   The following medications were delivered to the patient:  Discharge Medication List as of 3/15/2021 11:11 AM      START taking these medications    Details   ibuprofen (ADVIL;MOTRIN) 600 MG tablet Take 1 tablet by mouth three times daily for 7 days, Disp-30 tablet, R-1Print      Psyllium (METAMUCIL) 48.57 % POWD Take 1 Scoop by mouth 2 times daily, Disp-538 g, R-1Print      polyethylene glycol (MIRALAX) 17 GM/SCOOP POWD powder Take 17 g by mouth daily, Disp-850 g, R-1Print      oxyCODONE-acetaminophen (PERCOCET) 5-325 MG per tablet Take 1 tablet by mouth every 6 hours as needed for Pain for up to 5 days. , Disp-20 tablet, R-0Print         ·   Total # of Interventions Completed: 0  Time Spent (min): 30    Additional Documentation:

## 2021-03-15 NOTE — ANESTHESIA PRE PROCEDURE
Department of Anesthesiology  Preprocedure Note       Name:  Charan Salinas   Age:  65 y.o.  :  1950                                          MRN:  [id number]         Date:  3/15/2021      Surgeon: Destiney Costello):  Aj Aguirre MD    Procedure: Procedure(s):  VAGINAL VAULT SUSPENSION, ANTERIOR REPAIR, POSTERIOR REPAIR, ENTEROCELE REPAIR, CYSTOSCOPY    Medications prior to admission:   Prior to Admission medications    Medication Sig Start Date End Date Taking? Authorizing Provider   Multiple Vitamins-Minerals (CENTRUM WOMEN) TABS Take 1 tablet by mouth daily   Yes Historical Provider, MD   vitamin B-12 (CYANOCOBALAMIN) 500 MCG tablet Take 500 mcg by mouth daily chewable   Yes Historical Provider, MD   levothyroxine (SYNTHROID) 88 MCG tablet Take 88 mcg by mouth Daily  10/5/20  Yes Historical Provider, MD   conjugated estrogens (PREMARIN) 0.625 MG/GM vaginal cream Apply pea size amount of cream with index finger to the vagina two times per week 12/3/20  Yes Aj Aguirre MD   NONFORMULARY [facility] capsule once a day    Historical Provider, MD       Current medications:    Current Facility-Administered Medications   Medication Dose Route Frequency Provider Last Rate Last Admin    0.9 % sodium chloride infusion   Intravenous Continuous Iam Martinez MD        sodium chloride flush 0.9 % injection 10 mL  10 mL Intravenous 2 times per day Iam Martinez MD        sodium chloride flush 0.9 % injection 10 mL  10 mL Intravenous PRN Iam Martinez MD        metronidazole (FLAGYL) 500 mg in NaCl 100 mL IVPB premix  500 mg Intravenous On Call to Al Diaz MD        And    ciprofloxacin (CIPRO) IVPB 400 mg  400 mg Intravenous On Call to Al Diaz MD           Allergies:     Allergies   Allergen Reactions    Cefaclor Shortness Of Breath     Other reaction(s): Difficulty breathing    Sulfa Antibiotics      Other reaction(s): Hives       Problem List:    Patient Active Problem List   Diagnosis Code    Postmenopause Z78.0    Herniation of rectum into vagina N81.6    Hyperlipidemia E78.5    Hypothyroidism E03.9    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    Onychomycosis B35.1    Osteoarthritis of both hands M19.041, M19.042    Osteopenia M85.80    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       Past Medical History:        Diagnosis Date    Cancer [facility])     removal of a cancerous node from left thigh    Colon polyps     Cystocele, midline     Enterocele     Herniation of rectum into vagina     Hyperlipidemia     borderline-no meds    Hypothyroidism     Inflammatory dermatosis     Onychomycosis     Osteoarthritis of both hands     Osteopenia     Overactive bladder     Papillary carcinoma in situ of breast     Post-menopause     Rectocele     Thyroid disease     underactive    Vaginal atrophy     Vaginal vault prolapse        Past Surgical History:        Procedure Laterality Date    APPENDECTOMY      BREAST LUMPECTOMY      BREAST SURGERY      cyst benign    CHOLECYSTECTOMY      COLONOSCOPY      HYSTERECTOMY  1990    fibroids, excessive bleeding    OVARY REMOVAL      TONSILLECTOMY  1954    TUBAL LIGATION         Social History:    Social History     Tobacco Use    Smoking status: Never Smoker    Smokeless tobacco: Never Used   Substance Use Topics    Alcohol use: Yes     Comment: occasionally                                Counseling given: Not Answered      Vital Signs (Current):   Vitals:    03/09/21 1655   Weight: 134 lb (60.8 kg)   Height: 5' 6.5\" (1.689 m)                                              BP Readings from Last 3 Encounters:   01/06/21 (!) 140/80   12/03/20 (!) 149/84       NPO Status: Time of last liquid consumption: 2230                        Time of last solid consumption: 2000                        Date of last liquid consumption: 03/14/21                        Date of last solid food consumption: 03/14/21    BMI:   Wt Readings from Last 3 Encounters:   03/09/21 134 lb (60.8 kg)     Body mass index is 21.3 kg/m². CBC: No results found for: WBC, RBC, HGB, HCT, MCV, RDW, PLT    CMP: No results found for: NA, K, CL, CO2, BUN, CREATININE, GFRAA, AGRATIO, LABGLOM, GLUCOSE, PROT, CALCIUM, BILITOT, ALKPHOS, AST, ALT    POC Tests: No results for input(s): POCGLU, POCNA, POCK, POCCL, POCBUN, POCHEMO, POCHCT in the last 72 hours. Coags: No results found for: PROTIME, INR, APTT    HCG (If Applicable): No results found for: PREGTESTUR, PREGSERUM, HCG, HCGQUANT     ABGs: No results found for: PHART, PO2ART, RUW1APO, XBR6MUT, BEART, S7HPHUXF     Type & Screen (If Applicable):  No results found for: LABABO, LABRH    Drug/Infectious Status (If Applicable):  No results found for: HIV, HEPCAB    COVID-19 Screening (If Applicable):   Lab Results   Component Value Date    COVID19 Not Detected 03/09/2021           Anesthesia Evaluation  Patient summary reviewed and Nursing notes reviewed no history of anesthetic complications:   Airway: Mallampati: II  TM distance: >3 FB   Neck ROM: full  Mouth opening: > = 3 FB Dental: normal exam         Pulmonary:Negative Pulmonary ROS breath sounds clear to auscultation                             Cardiovascular:  Exercise tolerance: good (>4 METS),   (+) hyperlipidemia    (-) pacemaker, hypertension, valvular problems/murmurs, past MI, CAD, CABG/stent, dysrhythmias,  angina,  CHF, orthopnea and PND      Rhythm: regular                      Neuro/Psych:   Negative Neuro/Psych ROS              GI/Hepatic/Renal: Neg GI/Hepatic/Renal ROS            Endo/Other:    (+) hypothyroidism::., malignancy/cancer (h/o breast cancer). (-) diabetes mellitus, hyperthyroidism, blood dyscrasia, arthritis, no electrolyte abnormalities               Abdominal:           Vascular: negative vascular ROS. Anesthesia Plan      general     ASA 3       Induction: intravenous.     MIPS: Postoperative opioids intended and Prophylactic antiemetics administered. Anesthetic plan and risks discussed with patient. Plan discussed with CRNA. Taina Martinez MD   3/15/2021      This pre-anesthesia assessment may be used as a history and physical.    DOS STAFF ADDENDUM:    Pt seen and examined, chart reviewed (including anesthesia, drug and allergy history). No interval changes to history and physical examination. Anesthetic plan, risks, benefits, alternatives, and personnel involved discussed with patient. Patient verbalized an understanding and agrees to proceed.       Taina Martinez MD  March 15, 2021  6:40 AM

## 2021-03-15 NOTE — OP NOTE
Operative Note      Patient: Lidia Gastelum  YOB: 1950  MRN: [id number]    Date of Procedure: 3/15/2021    Pre-Op Diagnosis: PROLAPSE, VAGINAL VAULT PROLAPSE, CYSTOCELE MID, RECTOCELE, ENTEROCELE    Post-Op Diagnosis: Same       Procedures: Procedure(s):  VAGINAL VAULT SUSPENSION, PARAVAGINAL REPAIR, POSTERIOR REPAIR, ENTEROCELE REPAIR, CYSTOSCOPY     Surgeon: Surgeon(s):  Earlene Silverio MD    Anesthesia: General    Assistant: Surgical Assistant: Mala Pratt    Estimated Blood Loss (mL): 50 (units unknown)    IV FLUIDS: 600 milliliter(s) In: 600 [I.V.:600]  Out: 150 [Urine:100]    URINE OUTPUT: 100 milliters(s)   Output by Drain (mL) 03/13/21 0701 - 03/13/21 1500 03/13/21 1501 - 03/13/21 2300 03/13/21 2301 - 03/14/21 0700 03/14/21 0701 - 03/14/21 1500 03/14/21 1501 - 03/14/21 2300 03/14/21 2301 - 03/15/21 0700 03/15/21 0701 - 03/15/21 0922   Requested LDAs do not have output data documented. Complications: None    Specimens: * No specimens in log *    Implants: * No implants in log *      Drains:   Urethral Catheter Non-latex 16 fr (Active)       FINDINGS: Stage 3 prolapse. Normal appearing bladder and urethra without injury. Strong efflux noted from bilateral ureteral orifices. INDICATION FOR PROCEDURE: 65y.o. year old patient who presented with symptomatic pelvic organ prolapse and a previous hysterectomy and retropubic bladder neck suspension. . On exam she had stage 3 prolapse. She desired to proceed with appropriate surgical repairs. She was consented to the risks, including bleeding, risk of blood transfusion, infection, injury to surrounding organs such as bowel, bladder, ureters, urethra, the risk of postoperative voiding dysfunction or defecatory dysfunction, risk of nerve injury or re-exploration, the risk of recurrent prolapse or incontinence,   . The patient understood all of these risks and desired to proceed.       OPERATIVE NOTE:   The patient was this time, the cystocele was grasped using Allis clamps and injected with 1% lidocaine with epinephrine. A midline incision was then made with a scalpel and the bladder dissected free from the vaginal mucosa by means of sharp dissection. This dissection was taken out laterally until the inferior pubic rami. The cystocele was then reduced by placing bilateral, two point paravaginal  2-0 vicryl sutures. Excess vaginal tissue was then excised. The incision was repaired with 3-0 vicryl sutures. CYSTOURETHROSCOPY:  Cystourethroscopy was performed to confirm no injury to the bladder or urethra. The left ureter spilled well but the right ureter was sluggish so the right vault suspension suture was removed. After removal, the right ureter spilled well. RECTOCELE REPAIR AND PERINEORRHAPHY:    The rectocele was then plicated using several interrupted 0 prolene sutures towards the vaginal apex and 0 Vicryl suture in an interrupted fashion near the introitus. The posterior vaginal mucosa was then closed using 3-0 Vicryl in a running fashion and the perineum closed in a subcuticular fashion. The tails of the vaginal vault sutures were trimmed in the vagina. Iodiform vaginal packing was placed in the vagina and the Dobson catheter was left in place. Sponge, lap and needle counts were correct x 2. There were no complications. The patient tolerated the procedure well and was taken to the recovery room in stable condition.         Electronically signed by Carmela Currie MD on 3/15/2021 at 9:22 AM

## 2021-03-15 NOTE — BRIEF OP NOTE
Brief Postoperative Note      Patient: Lidia Gastelum  YOB: 1950  MRN: [id number]    Date of Procedure: 3/15/2021    Pre-Op Diagnosis: PROLAPSE, VAGINAL VAULT PROLAPSE, CYSTOCELE MID, RECTOCELE, ENTEROCELE    Post-Op Diagnosis: Same       Procedure(s):  VAGINAL VAULT SUSPENSION, ANTERIOR REPAIR, POSTERIOR REPAIR, ENTEROCELE REPAIR, CYSTOSCOPY    Surgeon(s):  Earlene Silverio MD    Assistant:  Surgical Assistant: Mala Pratt    Anesthesia: General    Estimated Blood Loss (mL): less than 821     Complications: None    Specimens:   * No specimens in log *    Implants:  * No implants in log *      Drains:   Urethral Catheter Non-latex 16 fr (Active)       Findings: large rectocele, vault prolapse, enterocele    Electronically signed by Yolanda Gray MD on 3/15/2021 at 9:09 AM

## 2021-03-15 NOTE — ANESTHESIA POSTPROCEDURE EVALUATION
Department of Anesthesiology  Postprocedure Note    Patient: Nitin Zhao  MRN: [id number]  YOB: 1950  Date of evaluation: 3/15/2021  Time:  11:29 AM     Procedure Summary     Date: 03/15/21 Room / Location: [facility] [address] [place] [address]    Anesthesia Start: 0964 Anesthesia Stop: 0122    Procedure: VAGINAL VAULT SUSPENSION, PARAVAGINAL REPAIR, POSTERIOR REPAIR, ENTEROCELE REPAIR, CYSTOSCOPY (N/A ) Diagnosis:       Rectocele      (PROLAPSE, VAGINAL VAULT PROLAPSE, CYSTOCELE MID, RECTOCELE, ENTEROCELE)    Surgeons: Guevara Cunningham MD Responsible Provider: Kelsey Hitchcock MD    Anesthesia Type: general ASA Status: 3          Anesthesia Type: general    Gloria Phase I: Gloria Score: 10    Gloria Phase II: Gloria Score: 10    Last vitals: Reviewed and per EMR flowsheets.        Anesthesia Post Evaluation    Patient location during evaluation: PACU  Patient participation: complete - patient participated  Level of consciousness: awake and alert  Airway patency: patent  Nausea & Vomiting: no nausea and no vomiting  Complications: no  Cardiovascular status: blood pressure returned to baseline  Respiratory status: acceptable  Hydration status: euvolemic

## 2021-03-15 NOTE — H&P
Date of Surgery Update:  Laila Isaac was seen, history and physical examination reviewed, and patient examined by me today. There have been no significant clinical changes since the completion of the previous history and physical. The surgical site was confirmed by the patient and me. The risk, benefits, and alternatives of the proposed procedure have been explained to the patient (or appropriate guardian) and understanding verbalized. All questions answered. Patient wishes to proceed.     Electronically signed by: Morro Brito MD,3/15/2021,7:28 AM

## 2021-03-15 NOTE — PROGRESS NOTES
1240- present. Vaginal packing removed. Tolerated well. Bladder instilled with 300 ml as ordered. Up to BR to void. Voided 200+ ml blood tinged urine. Did well, back to her room, up in chair.

## 2021-03-15 NOTE — PROGRESS NOTES
Ambulatory Surgery/Procedure Discharge Note    Vitals:    03/15/21 1311   BP: (!) 162/82   Pulse: 64   Resp: 16   Temp:    SpO2: 95%   BP stable    In: 750 [I.V.:750]  Out: 150 [Urine:100]    Restroom use offered before discharge. Yes    Pain assessment:  present - adequately treated  Pain Level: 5    Discharge instructions given to patient and her . Verbalized understanding, questions answered. Patient discharged to home/self care.  Patient discharged via wheel chair by transporter to waiting family/S.O.       3/15/2021 2:37 PM

## 2021-03-16 ENCOUNTER — TELEPHONE (OUTPATIENT)
Dept: UROGYNECOLOGY | Age: 71
End: 2021-03-16

## 2021-03-16 RX ORDER — IBUPROFEN 600 MG/1
600 TABLET ORAL 3 TIMES DAILY PRN
Qty: 21 TABLET | Refills: 0 | Status: SHIPPED | OUTPATIENT
Start: 2021-03-16 | End: 2021-04-28 | Stop reason: ALTCHOICE

## 2021-03-16 NOTE — TELEPHONE ENCOUNTER
Post Surgery Follow up    Pain-  Taking Percocet and Ibuprofen as prescribed by Dr. Gilbert Abreu. Patient denies any complaints at this time. Bowel Movements-   Taking Miralax, colace, and metamucil as prescribed by Dr Gilbert Abreu. Patient denies any complaints at this time. No BM at this time. Urinary- no complaints at this time. Vaginal area- vaginal bleeding is decreasing. Patient Denies any questions at this time. New orders received by DR Gilbert Abreu. patient verbalized understanding.

## 2021-03-30 ENCOUNTER — OFFICE VISIT (OUTPATIENT)
Dept: UROGYNECOLOGY | Age: 71
End: 2021-03-30

## 2021-03-30 VITALS
HEART RATE: 90 BPM | TEMPERATURE: 96.6 F | OXYGEN SATURATION: 98 % | SYSTOLIC BLOOD PRESSURE: 124 MMHG | RESPIRATION RATE: 20 BRPM | DIASTOLIC BLOOD PRESSURE: 73 MMHG

## 2021-03-30 DIAGNOSIS — Z09 POSTOP CHECK: Primary | ICD-10-CM

## 2021-03-30 PROCEDURE — 99024 POSTOP FOLLOW-UP VISIT: CPT | Performed by: OBSTETRICS & GYNECOLOGY

## 2021-03-30 RX ORDER — TRIAMCINOLONE ACETONIDE 0.25 MG/G
CREAM TOPICAL
COMMUNITY
Start: 2021-03-02

## 2021-03-30 RX ORDER — CONJUGATED ESTROGENS 0.62 MG/G
CREAM VAGINAL
COMMUNITY
Start: 2020-12-29

## 2021-03-30 NOTE — PROGRESS NOTES
3/30/2021       HPI:     Name: Katt Soria  YOB: 1950    CC: Katt Soria is a 65 y.o. female who is here for a post op evaluation. HPI: Recently underwent 3/15/21VAGINAL VAULT SUSPENSION, PARAVAGINAL REPAIR, POSTERIOR REPAIR, ENTEROCELE REPAIR, CYSTOSCOPY .   Voiding difficulty: No  Initiating stream: No  Force stream: No  Lean forward to empty: No  Slow/intermittent stream: No  Unable to empty bladder: No  Incontinence: no present prior to surgery  Urgency without incontinence: No   Frequency without incontinence: No present prior to surgery  Bowel functions: normal   Pain Controlled: Yes    Past Medical History:   Past Medical History:   Diagnosis Date    Cancer ([facility] [address]     removal of a cancerous node from left thigh    Colon polyps     Cystocele, midline     Enterocele     Herniation of rectum into vagina     Hyperlipidemia     borderline-no meds    Hypothyroidism     Inflammatory dermatosis     Onychomycosis     Osteoarthritis of both hands     Osteopenia     Overactive bladder     Papillary carcinoma in situ of breast     Post-menopause     Rectocele     Thyroid disease     underactive    Vaginal atrophy     Vaginal vault prolapse      Past Surgical History:   Past Surgical History:   Procedure Laterality Date    APPENDECTOMY      BREAST LUMPECTOMY      BREAST SURGERY      cyst benign    CHOLECYSTECTOMY      COLONOSCOPY      HYSTERECTOMY  1990    fibroids, excessive bleeding    OVARY REMOVAL      TONSILLECTOMY  1954    TUBAL LIGATION      VAGINA SURGERY N/A 3/15/2021    VAGINAL VAULT SUSPENSION, PARAVAGINAL REPAIR, POSTERIOR REPAIR, ENTEROCELE REPAIR, CYSTOSCOPY performed by Krystyna De Anda MD at [facility]     Current Medications:  Current Outpatient Medications   Medication Sig Dispense Refill    conjugated estrogens (PREMARIN) 0.625 MG/GM vaginal cream Place vaginally      triamcinolone (KENALOG) 0.025 % cream Apply topically      ibuprofen (ADVIL;MOTRIN) 600 MG tablet Take 1 tablet by mouth 3 times daily as needed for Pain 21 tablet 0    Psyllium (METAMUCIL) 48.57 % POWD Take 1 Scoop by mouth 2 times daily 538 g 1    polyethylene glycol (MIRALAX) 17 GM/SCOOP POWD powder Take 17 g by mouth daily 850 g 1    Multiple Vitamins-Minerals (CENTRUM WOMEN) TABS Take 1 tablet by mouth daily      vitamin B-12 (CYANOCOBALAMIN) 500 MCG tablet Take 500 mcg by mouth daily chewable      levothyroxine (SYNTHROID) 88 MCG tablet Take 88 mcg by mouth Daily       NONFORMULARY [facility] capsule once a day      ibuprofen (ADVIL;MOTRIN) 600 MG tablet Take 1 tablet by mouth three times daily for 7 days 30 tablet 1     No current facility-administered medications for this visit. Allergies:    Allergies   Allergen Reactions    Cefaclor Shortness Of Breath     Other reaction(s): Difficulty breathing    Sulfa Antibiotics      Other reaction(s): Hives     Social History:   Social History     Socioeconomic History    Marital status:      Spouse name: Not on file    Number of children: Not on file    Years of education: Not on file    Highest education level: Not on file   Occupational History    Occupation: retired     Comment: educator   Social Needs    Financial resource strain: Not on file    Food insecurity     Worry: Not on file     Inability: Not on file   [employer] needs     Medical: Not on file     Non-medical: Not on file   Tobacco Use    Smoking status: Never Smoker    Smokeless tobacco: Never Used   Substance and Sexual Activity    Alcohol use: Yes     Comment: occasionally    Drug use: Never    Sexual activity: Yes     Partners: Male   Lifestyle    Physical activity     Days per week: Not on file     Minutes per session: Not on file    Stress: Not on file   Relationships    Social connections     Talks on phone: Not on file     Gets together: Not on file     Attends Yarsan[employer] service: Not on file     Active member of club or

## 2021-03-30 NOTE — LETTER
[address] [place] [address] Urogynecology  [facility] [address] [facility]  Phone: [phone]  Fax: [phone]    Sukhi Morrow MD        March 30, 2021        Dear Isabel Clark,    I the pleasure of seeing Yumi Lux back in the office today for her 2-week postop visit. She is doing very well. Thank you for allowing me to participate in her care I have included a copy of her office note. Sincerely,    Jesus Figueroa MD   3/30/2021       HPI:     Name: Ender Pickens  YOB: 1950    CC: Ender Pickens is a 65 y.o. female who is here for a post op evaluation. HPI: Recently underwent 3/15/21VAGINAL VAULT SUSPENSION, PARAVAGINAL REPAIR, POSTERIOR REPAIR, ENTEROCELE REPAIR, CYSTOSCOPY .   Voiding difficulty: No  Initiating stream: No  Force stream: No  Lean forward to empty: No  Slow/intermittent stream: No  Unable to empty bladder: No  Incontinence: no present prior to surgery  Urgency without incontinence: No   Frequency without incontinence: No present prior to surgery  Bowel functions: normal   Pain Controlled: Yes    Past Medical History:   Past Medical History:   Diagnosis Date    Cancer ([facility] [address]     removal of a cancerous node from left thigh    Colon polyps     Cystocele, midline     Enterocele     Herniation of rectum into vagina     Hyperlipidemia     borderline-no meds    Hypothyroidism     Inflammatory dermatosis     Onychomycosis     Osteoarthritis of both hands     Osteopenia     Overactive bladder     Papillary carcinoma in situ of breast     Post-menopause     Rectocele     Thyroid disease     underactive    Vaginal atrophy     Vaginal vault prolapse      Past Surgical History:   Past Surgical History:   Procedure Laterality Date    APPENDECTOMY      BREAST LUMPECTOMY      BREAST SURGERY      cyst benign    CHOLECYSTECTOMY      COLONOSCOPY      HYSTERECTOMY  1990    fibroids, excessive bleeding    OVARY REMOVAL      TONSILLECTOMY  1954    TUBAL LIGATION      VAGINA SURGERY N/A 3/15/2021    VAGINAL VAULT SUSPENSION, PARAVAGINAL REPAIR, POSTERIOR REPAIR, ENTEROCELE REPAIR, CYSTOSCOPY performed by Chay Mann MD at [facility]      Current Medications:  Current Outpatient Medications   Medication Sig Dispense Refill    conjugated estrogens (PREMARIN) 0.625 MG/GM vaginal cream Place vaginally      triamcinolone (KENALOG) 0.025 % cream Apply topically      ibuprofen (ADVIL;MOTRIN) 600 MG tablet Take 1 tablet by mouth 3 times daily as needed for Pain 21 tablet 0    Psyllium (METAMUCIL) 48.57 % POWD Take 1 Scoop by mouth 2 times daily 538 g 1    polyethylene glycol (MIRALAX) 17 GM/SCOOP POWD powder Take 17 g by mouth daily 850 g 1    Multiple Vitamins-Minerals (CENTRUM WOMEN) TABS Take 1 tablet by mouth daily      vitamin B-12 (CYANOCOBALAMIN) 500 MCG tablet Take 500 mcg by mouth daily chewable      levothyroxine (SYNTHROID) 88 MCG tablet Take 88 mcg by mouth Daily       NONFORMULARY [facility] capsule once a day      ibuprofen (ADVIL;MOTRIN) 600 MG tablet Take 1 tablet by mouth three times daily for 7 days 30 tablet 1     No current facility-administered medications for this visit. Allergies:    Allergies   Allergen Reactions    Cefaclor Shortness Of Breath     Other reaction(s): Difficulty breathing    Sulfa Antibiotics      Other reaction(s): Hives     Social History:   Social History     Socioeconomic History    Marital status:      Spouse name: Not on file    Number of children: Not on file    Years of education: Not on file    Highest education level: Not on file   Occupational History    Occupation: retired     Comment: educator   Social Needs    Financial resource strain: Not on file    Food insecurity     Worry: Not on file     Inability: Not on file   [employer] needs     Medical: Not on file     Non-medical: Not on file   Tobacco Use    Smoking status: Never Smoker    follow up in 4 weeks. No orders of the defined types were placed in this encounter. No orders of the defined types were placed in this encounter.       Ashley Hernandez

## 2021-04-28 ENCOUNTER — OFFICE VISIT (OUTPATIENT)
Dept: UROGYNECOLOGY | Age: 71
End: 2021-04-28

## 2021-04-28 VITALS
TEMPERATURE: 97.5 F | SYSTOLIC BLOOD PRESSURE: 155 MMHG | HEART RATE: 75 BPM | RESPIRATION RATE: 16 BRPM | DIASTOLIC BLOOD PRESSURE: 81 MMHG | OXYGEN SATURATION: 98 %

## 2021-04-28 DIAGNOSIS — Z09 POSTOP CHECK: Primary | ICD-10-CM

## 2021-04-28 DIAGNOSIS — N95.2 VAGINAL ATROPHY: ICD-10-CM

## 2021-04-28 PROCEDURE — 99024 POSTOP FOLLOW-UP VISIT: CPT | Performed by: NURSE PRACTITIONER

## 2021-04-28 RX ORDER — DOCUSATE SODIUM 100 MG/1
100 CAPSULE, LIQUID FILLED ORAL 2 TIMES DAILY
COMMUNITY
End: 2021-06-09 | Stop reason: ALTCHOICE

## 2021-04-28 RX ORDER — PSYLLIUM HUSK 0.4 G
0.52 CAPSULE ORAL DAILY
COMMUNITY

## 2021-04-28 RX ORDER — POLYETHYLENE GLYCOL 3350 17 G/17G
17 POWDER, FOR SOLUTION ORAL DAILY
COMMUNITY

## 2021-04-28 NOTE — PROGRESS NOTES
4/28/2021     HPI:     Name: Annalee Timmons  YOB: 1950    CC: Annalee Timmons is a 66 y.o. female who is here for a post op evaluation. HPI: Recently underwent 3/15/21 VAGINAL VAULT SUSPENSION, PARAVAGINAL REPAIR, POSTERIOR REPAIR, ENTEROCELE REPAIR, CYSTOSCOPY .   Voiding difficulty: No  Initiating stream: Yes  Force stream: No  Lean forward to empty: No  Slow/intermittent stream: Yes  Unable to empty bladder: No  Incontinence: no   Urgency without incontinence: No   Frequency without incontinence: No  Bowel functions: normal   Pain Controlled: Yes    Past Medical History:   Past Medical History:   Diagnosis Date    Cancer ([facility] [address]     removal of a cancerous node from left thigh    Colon polyps     Cystocele, midline     Enterocele     Herniation of rectum into vagina     Hyperlipidemia     borderline-no meds    Hypothyroidism     Inflammatory dermatosis     Onychomycosis     Osteoarthritis of both hands     Osteopenia     Overactive bladder     Papillary carcinoma in situ of breast     Post-menopause     Rectocele     Thyroid disease     underactive    Vaginal atrophy     Vaginal vault prolapse      Past Surgical History:   Past Surgical History:   Procedure Laterality Date    APPENDECTOMY      BREAST LUMPECTOMY      BREAST SURGERY      cyst benign    CHOLECYSTECTOMY      COLONOSCOPY      HYSTERECTOMY  1990    fibroids, excessive bleeding    OVARY REMOVAL      TONSILLECTOMY  1954    TUBAL LIGATION      VAGINA SURGERY N/A 3/15/2021    VAGINAL VAULT SUSPENSION, PARAVAGINAL REPAIR, POSTERIOR REPAIR, ENTEROCELE REPAIR, CYSTOSCOPY performed by Niels Greenwood MD at [facility]     Current Medications:  Current Outpatient Medications   Medication Sig Dispense Refill    psyllium (METAMUCIL) 0.52 g capsule Take 0.52 g by mouth daily      polyethylene glycol (GLYCOLAX) 17 GM/SCOOP powder Take 17 g by mouth daily      docusate sodium (COLACE) 100 MG capsule Take 100 mg by mouth 2 times daily      triamcinolone (KENALOG) 0.025 % cream Apply topically      Multiple Vitamins-Minerals (CENTRUM WOMEN) TABS Take 1 tablet by mouth daily      vitamin B-12 (CYANOCOBALAMIN) 500 MCG tablet Take 500 mcg by mouth daily chewable      levothyroxine (SYNTHROID) 88 MCG tablet Take 88 mcg by mouth Daily       NONFORMULARY Brown colon health capsule once a day      conjugated estrogens (PREMARIN) 0.625 MG/GM vaginal cream Place vaginally       No current facility-administered medications for this visit. Allergies:    Allergies   Allergen Reactions    Cefaclor Shortness Of Breath     Other reaction(s): Difficulty breathing    Sulfa Antibiotics      Other reaction(s): Hives     Social History:   Social History     Socioeconomic History    Marital status:      Spouse name: Not on file    Number of children: Not on file    Years of education: Not on file    Highest education level: Not on file   Occupational History    Occupation: retired     Comment: educator   Social Needs    Financial resource strain: Not on file    Food insecurity     Worry: Not on file     Inability: Not on file   School & Fashion needs     Medical: Not on file     Non-medical: Not on file   Tobacco Use    Smoking status: Never Smoker    Smokeless tobacco: Never Used   Substance and Sexual Activity    Alcohol use: Yes     Comment: occasionally    Drug use: Never    Sexual activity: Yes     Partners: Male   Lifestyle    Physical activity     Days per week: Not on file     Minutes per session: Not on file    Stress: Not on file   Relationships    Social connections     Talks on phone: Not on file     Gets together: Not on file     Attends Roman Catholic service: Not on file     Active member of club or organization: Not on file     Attends meetings of clubs or organizations: Not on file     Relationship status: Not on file    Intimate partner violence     Fear of current or ex partner: Not on file Emotionally abused: Not on file     Physically abused: Not on file     Forced sexual activity: Not on file   Other Topics Concern    Not on file   Social History Narrative    Not on file     Family History:   Family History   Problem Relation Age of Onset    Heart Failure Father     Diabetes Father     High Cholesterol Father     Cancer Maternal Aunt         gones    Breast Cancer Maternal Aunt     Diabetes Maternal Great Grandmother     Other Sister         posterior cortical atrophy similar to alzheimer's    Alzheimer's Disease Sister     High Cholesterol Mother      Review of System   Review of Systems   All other systems reviewed and are negative. A review of systems was done by the patient and reviewed by me and scanned into media today. Objective:     Vitals  Vitals:    04/28/21 1116   BP: (!) 155/81   Pulse: 75   Resp: 16   Temp: 97.5 °F (36.4 °C)   SpO2: 98%     Physical Exam  Physical Exam  Vitals signs and nursing note reviewed. Constitutional:       Appearance: Normal appearance. HENT:      Head: Normocephalic. Eyes:      Conjunctiva/sclera: Conjunctivae normal.   Neck:      Musculoskeletal: Normal range of motion. Cardiovascular:      Rate and Rhythm: Normal rate. Pulmonary:      Effort: Pulmonary effort is normal.   Musculoskeletal: Normal range of motion. Skin:     General: Skin is warm and dry. Neurological:      General: No focal deficit present. Mental Status: She is alert and oriented to person, place, and time. Psychiatric:         Mood and Affect: Mood normal.         Behavior: Behavior normal.         Thought Content: Thought content normal.       All surgical incisions healing well. No erythema. No evidence of hematoma or abscess. Sutures visible  Vaginal atrophy noted      No results found for this visit on 04/28/21. Assessment/Plan:     Tiburcio Esqueda is a 66 y.o. female with   1. Postop check    2.  Vaginal atrophy        Patient is doing well 6

## 2021-06-09 ENCOUNTER — OFFICE VISIT (OUTPATIENT)
Dept: UROGYNECOLOGY | Age: 71
End: 2021-06-09

## 2021-06-09 VITALS
TEMPERATURE: 97.9 F | HEART RATE: 80 BPM | OXYGEN SATURATION: 98 % | RESPIRATION RATE: 16 BRPM | SYSTOLIC BLOOD PRESSURE: 128 MMHG | DIASTOLIC BLOOD PRESSURE: 75 MMHG

## 2021-06-09 DIAGNOSIS — Z09 POSTOP CHECK: Primary | ICD-10-CM

## 2021-06-09 PROCEDURE — 99024 POSTOP FOLLOW-UP VISIT: CPT | Performed by: NURSE PRACTITIONER

## 2021-06-09 ASSESSMENT — ENCOUNTER SYMPTOMS: EYE ITCHING: 1

## 2021-06-09 NOTE — PROGRESS NOTES
6/9/2021     HPI:     Name: Debbi Andujar   YOB: 1950    CC: Debbi Andujar is a 66 y.o. female who is here for a post op evaluation. HPI: Recently underwent 3/15/21VAGINAL VAULT SUSPENSION, PARAVAGINAL REPAIR, POSTERIOR REPAIR, ENTEROCELE REPAIR, CYSTOSCOPY.   Voiding difficulty: No  Initiating stream: Yes  Force stream: No  Lean forward to empty: No  Slow/intermittent stream: No  Unable to empty bladder: No  Incontinence: no   Urgency without incontinence: No   Frequency without incontinence: No  Bowel functions: normal   Pain Controlled: Yes    Past Medical History:   Past Medical History:   Diagnosis Date    Cancer ([facility] [address]     removal of a cancerous node from left thigh    Colon polyps     Cystocele, midline     Enterocele     Herniation of rectum into vagina     Hyperlipidemia     borderline-no meds    Hypothyroidism     Inflammatory dermatosis     Onychomycosis     Osteoarthritis of both hands     Osteopenia     Overactive bladder     Papillary carcinoma in situ of breast     Post-menopause     Rectocele     Thyroid disease     underactive    Vaginal atrophy     Vaginal vault prolapse      Past Surgical History:   Past Surgical History:   Procedure Laterality Date    APPENDECTOMY      BREAST LUMPECTOMY      BREAST SURGERY      cyst benign    CHOLECYSTECTOMY      COLONOSCOPY      HYSTERECTOMY  1990    fibroids, excessive bleeding    OVARY REMOVAL      TONSILLECTOMY  1954    TUBAL LIGATION      VAGINA SURGERY N/A 3/15/2021    VAGINAL VAULT SUSPENSION, PARAVAGINAL REPAIR, POSTERIOR REPAIR, ENTEROCELE REPAIR, CYSTOSCOPY performed by Kelly Peralta MD at [place] [id number]     Current Medications:  Current Outpatient Medications   Medication Sig Dispense Refill    psyllium (METAMUCIL) 0.52 g capsule Take 0.52 g by mouth daily      polyethylene glycol (GLYCOLAX) 17 GM/SCOOP powder Take 17 g by mouth daily      conjugated estrogens (PREMARIN) 0.625 MG/GM vaginal cream Place vaginally      triamcinolone (KENALOG) 0.025 % cream Apply topically      Multiple Vitamins-Minerals (CENTRUM WOMEN) TABS Take 1 tablet by mouth daily      vitamin B-12 (CYANOCOBALAMIN) 500 MCG tablet Take 500 mcg by mouth daily chewable      levothyroxine (SYNTHROID) 88 MCG tablet Take 88 mcg by mouth Daily       NONFORMULARY Brown colon health capsule once a day       No current facility-administered medications for this visit. Allergies: Allergies   Allergen Reactions    Cefaclor Shortness Of Breath     Other reaction(s): Difficulty breathing    Sulfa Antibiotics      Other reaction(s): Hives     Social History:   Social History     Socioeconomic History    Marital status:      Spouse name: Not on file    Number of children: Not on file    Years of education: Not on file    Highest education level: Not on file   Occupational History    Occupation: retired     Comment: educator   Tobacco Use    Smoking status: Never Smoker    Smokeless tobacco: Never Used   Vaping Use    Vaping Use: Never used   Substance and Sexual Activity    Alcohol use: Yes     Comment: occasionally    Drug use: Never    Sexual activity: Yes     Partners: Male   Other Topics Concern    Not on file   Social History Narrative    Not on file     Social Determinants of Health     Financial Resource Strain:     Difficulty of Paying Living Expenses:    Food Insecurity:     Worried About Running Out of Food in the Last Year:     [address] in the Last Year:    Transportation Needs:     Lack of Transportation (Medical):      Lack of Transportation (Non-Medical):    Physical Activity:     Days of Exercise per Week:     Minutes of Exercise per Session:    Stress:     Feeling of Stress :    Social Connections:     Frequency of Communication with Friends and Family:     Frequency of Social Gatherings with Friends and Family:     Attends Adventism Services:     Active Member of Clubs or Organizations:  Attends Club or Organization Meetings:     Marital Status:    Intimate Partner Violence:     Fear of Current or Ex-Partner:     Emotionally Abused:     Physically Abused:     Sexually Abused:      Family History:   Family History   Problem Relation Age of Onset    Heart Failure Father     Diabetes Father     High Cholesterol Father     Cancer Maternal Aunt         gones    Breast Cancer Maternal Aunt     Diabetes Maternal Great Grandmother     Other Sister         posterior cortical atrophy similar to alzheimer's    Alzheimer's Disease Sister     High Cholesterol Mother      Review of System   Review of Systems   Constitutional: Positive for activity change. Eyes: Positive for itching. Allergic/Immunologic: Positive for environmental allergies. Neurological: Positive for headaches. All other systems reviewed and are negative. A review of systems was done by the patient and reviewed by me and scanned into media today. Objective:     Vitals  Vitals:    06/09/21 1117   BP: 128/75   Pulse: 80   Resp: 16   Temp: 97.9 °F (36.6 °C)   SpO2: 98%     Physical Exam  Physical Exam  Vitals and nursing note reviewed. Constitutional:       Appearance: Normal appearance. HENT:      Head: Normocephalic. Eyes:      Conjunctiva/sclera: Conjunctivae normal.   Cardiovascular:      Rate and Rhythm: Normal rate. Pulmonary:      Effort: Pulmonary effort is normal.   Musculoskeletal:         General: Normal range of motion. Cervical back: Normal range of motion. Skin:     General: Skin is warm and dry. Neurological:      General: No focal deficit present. Mental Status: She is alert and oriented to person, place, and time. Psychiatric:         Mood and Affect: Mood normal.         Behavior: Behavior normal.         Thought Content: Thought content normal.       All surgical incisions healing well. No erythema. No evidence of hematoma or abscess.     No results found for this visit on 06/09/21. Assessment/Plan:     Mario Moffett is a 66 y.o. female with   1. Postop check        Patient is doing well 12 weeks. Restrictions lifted. Patient is to follow up in prn . TIKA Hernandez CNP      No orders of the defined types were placed in this encounter. No orders of the defined types were placed in this encounter.       TIKA Hernandez - CNP

## 2022-11-22 RX ORDER — CETIRIZINE HYDROCHLORIDE 10 MG/1
10 TABLET ORAL DAILY
COMMUNITY

## 2022-11-22 RX ORDER — DICLOFENAC SODIUM 1 MG/ML
1 SOLUTION/ DROPS OPHTHALMIC 4 TIMES DAILY
COMMUNITY
End: 2022-11-22 | Stop reason: ALTCHOICE

## 2022-11-22 RX ORDER — ATORVASTATIN CALCIUM 40 MG/1
40 TABLET, FILM COATED ORAL DAILY
COMMUNITY

## 2022-11-22 NOTE — PROGRESS NOTES
[address] [facility] time___0740_________        Surgery time____0910________    Take the following medications with a sip of water: Follow your MD/Surgeons pre-procedure instructions regarding your medications     Do not eat or drink anything after 12:00 midnight prior to your surgery. This includes water chewing gum, mints and ice chips. You may brush your teeth and gargle the morning of your surgery, but do not swallow the water     Please see your family doctor/pediatrician for a history and physical and/or concerning medications. H&P 11/18/22 JACQUES Raymond    Bring any test results/reports from your physicians office. If you are under the care of a heart doctor or specialist doctor, please be aware that you may be asked to them for clearance    You may be asked to stop blood thinners such as Coumadin, Plavix, Fragmin, Lovenox, etc., or any anti-inflammatories such as:  Aspirin, Ibuprofen, Advil, Naproxen prior to your surgery. We also ask that you stop any OTC medications such as fish oil, vitamin E, glucosamine, garlic, Multivitamins, COQ 10, etc.    We ask that you do not smoke 24 hours prior to surgery  We ask that you do not  drink any alcoholic beverages 24 hours prior to surgery     You must make arrangements for a responsible adult to take you home after your surgery. For your safety you will not be allowed to leave alone or drive yourself home. Your surgery will be cancelled if you do not have a ride home. Also for your safety, it is strongly suggested that someone stay with you the first 24 hours after your surgery. A parent or legal guardian must accompany a child scheduled for surgery and plan to stay at the hospital until the child is discharged. Please do not bring other children with you. For your comfort, please wear simple loose fitting clothing to the hospital.  Please do not bring valuables.  Wear short sleeve button down shirt or loose shirt and bring eye drops or eye ointment. Do not wear any make-up or nail polish on your fingers or toes      For your safety, please do not wear any jewelry or body piercing's on the day of surgery. All jewelry must be removed. If you have dentures, they will be removed before going to operating room. For your convenience, we will provide you with a container. If you wear contact lenses or glasses, they will be removed, please bring a case for them. If you have a living will and a durable power of  for healthcare, please bring in a copy. As part of our patient safety program to minimize surgical site infections, we ask you to do the following:    Please notify your surgeon if you develop any illness between         now and the  day of your surgery. This includes a cough, cold, fever, sore throat, nausea,         or vomiting, and diarrhea, etc.   Please notify your surgeon if you experience dizziness, shortness         of breath or blurred vision between now and the time of your surgery. Do not shave your operative site 96 hours prior to surgery. For face and neck surgery, men may use an electric razor 48 hours   prior to surgery. You may shower the night before surgery or the morning of   your surgery with an antibacterial soap. You will need to bring a photo ID and insurance card    [facility] has an onsite pharmacy, would you like to utilize our pharmacy     If you will be staying overnight and use a C-pap machine, please bring   your C-pap to hospital     Our goal is to provide you with excellent care, therefore, visitors will be limited to two(2) in the room at a time so that we may focus on providing this care for you. Please contact pre-admission testing if you have any further questions.                  [facility] phone number:  [phone]  Please note these are generalized instructions for all surgical cases, you may be provided with more specific instructions according to your surgery. C-Difficile admission screening and protocol:       * Admitted with diarrhea? [] YES    [x]  NO     *Prior history of C-Diff. In last 3 months? [] YES    [x]  NO     *Antibiotic use in the past 6-8 weeks? [x]  NO    []  YES                 If yes, which ANTIBIOTIC AND REASON______     *Prior hospitalization or nursing home in the last month? []  YES    [x]  NO        SAFETY FIRST. .call before you fall

## 2022-11-29 NOTE — PERIOP NOTE
[address]        Pre-Op Phone Call:     Patient Name: Humberto Fabry     Telephone Information:   Mobile [phone]     Home phone:  [phone]    Surgery Time:    9:10 AM     Arrival Time:  7:40     Left extended Message:  Yes     Message left with: Left Voicemail      Recent change in health status:  NA     Advised of transportation/ policy:  Yes     NPO policy reviewed: Yes     Advised to take morning heart/blood pressure medications with sips of water morning of surgery? Yes     Instructed to bring eye drops, photo identification, and insurance card day of surgery? Yes     Advised to wear short sleeved button down shirt (no T-shirt underneath):  Yes     Advised not to wear jewelry, hairpins, or pantyhose day of surgery? Yes     Advised not to wear make-up and to wash face day of surgery?   Yes    Remarks: Left Voicemail         Electronically signed by:  Eron Phillips RN at 11/29/2022 10:02 AM

## 2022-11-30 ENCOUNTER — PREP FOR PROCEDURE (OUTPATIENT)
Dept: OPHTHALMOLOGY | Age: 72
End: 2022-11-30

## 2022-11-30 ENCOUNTER — ANESTHESIA EVENT (OUTPATIENT)
Dept: SURGERY | Age: 72
End: 2022-11-30
Payer: MEDICARE

## 2022-11-30 RX ORDER — SODIUM CHLORIDE 0.9 % (FLUSH) 0.9 %
5-40 SYRINGE (ML) INJECTION PRN
Status: CANCELLED | OUTPATIENT
Start: 2022-11-30

## 2022-11-30 RX ORDER — KETOROLAC TROMETHAMINE 5 MG/ML
1 SOLUTION OPHTHALMIC SEE ADMIN INSTRUCTIONS
Status: CANCELLED | OUTPATIENT
Start: 2022-11-30

## 2022-11-30 RX ORDER — SODIUM CHLORIDE 9 MG/ML
INJECTION, SOLUTION INTRAVENOUS PRN
Status: CANCELLED | OUTPATIENT
Start: 2022-11-30

## 2022-11-30 RX ORDER — TETRACAINE HYDROCHLORIDE 5 MG/ML
1 SOLUTION OPHTHALMIC SEE ADMIN INSTRUCTIONS
Status: CANCELLED | OUTPATIENT
Start: 2022-11-30

## 2022-11-30 RX ORDER — CIPROFLOXACIN HYDROCHLORIDE 3.5 MG/ML
1 SOLUTION/ DROPS TOPICAL SEE ADMIN INSTRUCTIONS
Status: CANCELLED | OUTPATIENT
Start: 2022-11-30

## 2022-11-30 RX ORDER — SODIUM CHLORIDE 0.9 % (FLUSH) 0.9 %
5-40 SYRINGE (ML) INJECTION EVERY 12 HOURS SCHEDULED
Status: CANCELLED | OUTPATIENT
Start: 2022-11-30

## 2022-11-30 RX ORDER — TROPICAMIDE 10 MG/ML
1 SOLUTION/ DROPS OPHTHALMIC SEE ADMIN INSTRUCTIONS
Status: CANCELLED | OUTPATIENT
Start: 2022-11-30

## 2022-11-30 RX ORDER — PHENYLEPHRINE HCL 2.5 %
1 DROPS OPHTHALMIC (EYE) SEE ADMIN INSTRUCTIONS
Status: CANCELLED | OUTPATIENT
Start: 2022-11-30

## 2022-12-01 ENCOUNTER — HOSPITAL ENCOUNTER (OUTPATIENT)
Age: 72
Setting detail: OUTPATIENT SURGERY
Discharge: HOME | End: 2022-12-01
Attending: OPHTHALMOLOGY | Admitting: OPHTHALMOLOGY
Payer: MEDICARE

## 2022-12-01 ENCOUNTER — ANESTHESIA (OUTPATIENT)
Dept: SURGERY | Age: 72
End: 2022-12-01
Payer: MEDICARE

## 2022-12-01 VITALS
OXYGEN SATURATION: 97 % | SYSTOLIC BLOOD PRESSURE: 140 MMHG | WEIGHT: 133 LBS | BODY MASS INDEX: 20.88 KG/M2 | DIASTOLIC BLOOD PRESSURE: 81 MMHG | RESPIRATION RATE: 17 BRPM | HEIGHT: 67 IN | HEART RATE: 74 BPM | TEMPERATURE: 96.4 F

## 2022-12-01 PROCEDURE — 6370000000 HC RX 637 (ALT 250 FOR IP): Performed by: OPHTHALMOLOGY

## 2022-12-01 PROCEDURE — 2580000003 HC RX 258: Performed by: ANESTHESIOLOGY

## 2022-12-01 PROCEDURE — V2632 POST CHMBR INTRAOCULAR LENS: HCPCS | Performed by: OPHTHALMOLOGY

## 2022-12-01 PROCEDURE — 2709999900 HC NON-CHARGEABLE SUPPLY: Performed by: OPHTHALMOLOGY

## 2022-12-01 PROCEDURE — 3700000000 HC ANESTHESIA ATTENDED CARE: Performed by: OPHTHALMOLOGY

## 2022-12-01 PROCEDURE — 2500000003 HC RX 250 WO HCPCS: Performed by: OPHTHALMOLOGY

## 2022-12-01 PROCEDURE — 3600000014 HC SURGERY LEVEL 4 ADDTL 15MIN: Performed by: OPHTHALMOLOGY

## 2022-12-01 PROCEDURE — 3600000004 HC SURGERY LEVEL 4 BASE: Performed by: OPHTHALMOLOGY

## 2022-12-01 PROCEDURE — 7100000010 HC PHASE II RECOVERY - FIRST 15 MIN: Performed by: OPHTHALMOLOGY

## 2022-12-01 PROCEDURE — 3700000001 HC ADD 15 MINUTES (ANESTHESIA): Performed by: OPHTHALMOLOGY

## 2022-12-01 PROCEDURE — 6360000002 HC RX W HCPCS: Performed by: NURSE ANESTHETIST, CERTIFIED REGISTERED

## 2022-12-01 DEVICE — ACRYSOF(R) NATURAL SINGLE-PIECE ACRYLICFOLDABLE IOL, UV WITH BLUE LIGHT FILTER,POSTERIOR CHAMBER LENS (IOL/PC), 13.0MM LENGTH,6.0MM BICONVEX OPTIC, PLANAR HAPTICS.
Type: IMPLANTABLE DEVICE | Site: EYE | Status: FUNCTIONAL
Brand: ACRYSOF®

## 2022-12-01 RX ORDER — SODIUM CHLORIDE 0.9 % (FLUSH) 0.9 %
5-40 SYRINGE (ML) INJECTION PRN
Status: DISCONTINUED | OUTPATIENT
Start: 2022-12-01 | End: 2022-12-01 | Stop reason: HOSPADM

## 2022-12-01 RX ORDER — SODIUM CHLORIDE 9 MG/ML
INJECTION, SOLUTION INTRAVENOUS CONTINUOUS
Status: DISCONTINUED | OUTPATIENT
Start: 2022-12-01 | End: 2022-12-01 | Stop reason: HOSPADM

## 2022-12-01 RX ORDER — BALANCED SALT SOLUTION 6.4; .75; .48; .3; 3.9; 1.7 MG/ML; MG/ML; MG/ML; MG/ML; MG/ML; MG/ML
SOLUTION OPHTHALMIC
Status: COMPLETED | OUTPATIENT
Start: 2022-12-01 | End: 2022-12-01

## 2022-12-01 RX ORDER — LIDOCAINE HYDROCHLORIDE 10 MG/ML
INJECTION, SOLUTION EPIDURAL; INFILTRATION; INTRACAUDAL; PERINEURAL
Status: COMPLETED | OUTPATIENT
Start: 2022-12-01 | End: 2022-12-01

## 2022-12-01 RX ORDER — OFLOXACIN 3 MG/ML
SOLUTION/ DROPS OPHTHALMIC
Status: COMPLETED | OUTPATIENT
Start: 2022-12-01 | End: 2022-12-01

## 2022-12-01 RX ORDER — TETRACAINE HYDROCHLORIDE 5 MG/ML
SOLUTION OPHTHALMIC
Status: COMPLETED | OUTPATIENT
Start: 2022-12-01 | End: 2022-12-01

## 2022-12-01 RX ORDER — SODIUM CHLORIDE 9 MG/ML
INJECTION, SOLUTION INTRAVENOUS PRN
Status: DISCONTINUED | OUTPATIENT
Start: 2022-12-01 | End: 2022-12-01 | Stop reason: HOSPADM

## 2022-12-01 RX ORDER — SODIUM CHLORIDE 0.9 % (FLUSH) 0.9 %
5-40 SYRINGE (ML) INJECTION EVERY 12 HOURS SCHEDULED
Status: DISCONTINUED | OUTPATIENT
Start: 2022-12-01 | End: 2022-12-01 | Stop reason: HOSPADM

## 2022-12-01 RX ORDER — KETOROLAC TROMETHAMINE 5 MG/ML
1 SOLUTION OPHTHALMIC SEE ADMIN INSTRUCTIONS
Status: COMPLETED | OUTPATIENT
Start: 2022-12-01 | End: 2022-12-01

## 2022-12-01 RX ORDER — TROPICAMIDE 10 MG/ML
1 SOLUTION/ DROPS OPHTHALMIC SEE ADMIN INSTRUCTIONS
Status: COMPLETED | OUTPATIENT
Start: 2022-12-01 | End: 2022-12-01

## 2022-12-01 RX ORDER — CIPROFLOXACIN HYDROCHLORIDE 3.5 MG/ML
1 SOLUTION/ DROPS TOPICAL SEE ADMIN INSTRUCTIONS
Status: COMPLETED | OUTPATIENT
Start: 2022-12-01 | End: 2022-12-01

## 2022-12-01 RX ORDER — TETRACAINE HYDROCHLORIDE 5 MG/ML
1 SOLUTION OPHTHALMIC SEE ADMIN INSTRUCTIONS
Status: DISCONTINUED | OUTPATIENT
Start: 2022-12-01 | End: 2022-12-01 | Stop reason: HOSPADM

## 2022-12-01 RX ORDER — MIDAZOLAM HYDROCHLORIDE 1 MG/ML
INJECTION INTRAMUSCULAR; INTRAVENOUS PRN
Status: DISCONTINUED | OUTPATIENT
Start: 2022-12-01 | End: 2022-12-01 | Stop reason: SDUPTHER

## 2022-12-01 RX ORDER — PHENYLEPHRINE HCL 2.5 %
1 DROPS OPHTHALMIC (EYE) SEE ADMIN INSTRUCTIONS
Status: COMPLETED | OUTPATIENT
Start: 2022-12-01 | End: 2022-12-01

## 2022-12-01 RX ORDER — BRIMONIDINE TARTRATE 2 MG/ML
SOLUTION/ DROPS OPHTHALMIC
Status: COMPLETED | OUTPATIENT
Start: 2022-12-01 | End: 2022-12-01

## 2022-12-01 RX ADMIN — MIDAZOLAM 1 MG: 1 INJECTION INTRAMUSCULAR; INTRAVENOUS at 09:10

## 2022-12-01 RX ADMIN — SODIUM CHLORIDE: 9 INJECTION, SOLUTION INTRAVENOUS at 09:06

## 2022-12-01 RX ADMIN — PHENYLEPHRINE HYDROCHLORIDE 1 DROP: 25 SOLUTION/ DROPS OPHTHALMIC at 08:09

## 2022-12-01 RX ADMIN — TROPICAMIDE 1 DROP: 10 SOLUTION/ DROPS OPHTHALMIC at 08:00

## 2022-12-01 RX ADMIN — KETOROLAC TROMETHAMINE 1 DROP: 5 SOLUTION/ DROPS OPHTHALMIC at 08:00

## 2022-12-01 RX ADMIN — PHENYLEPHRINE HYDROCHLORIDE 1 DROP: 25 SOLUTION/ DROPS OPHTHALMIC at 08:00

## 2022-12-01 RX ADMIN — POVIDONE-IODINE: 5 SOLUTION OPHTHALMIC at 08:00

## 2022-12-01 RX ADMIN — PHENYLEPHRINE HYDROCHLORIDE 1 DROP: 25 SOLUTION/ DROPS OPHTHALMIC at 08:05

## 2022-12-01 RX ADMIN — SODIUM CHLORIDE: 9 INJECTION, SOLUTION INTRAVENOUS at 08:09

## 2022-12-01 RX ADMIN — TETRACAINE HYDROCHLORIDE 1 DROP: 5 SOLUTION OPHTHALMIC at 08:00

## 2022-12-01 RX ADMIN — TROPICAMIDE 1 DROP: 10 SOLUTION/ DROPS OPHTHALMIC at 08:09

## 2022-12-01 RX ADMIN — MIDAZOLAM 1 MG: 1 INJECTION INTRAMUSCULAR; INTRAVENOUS at 09:06

## 2022-12-01 RX ADMIN — CIPROFLOXACIN 1 DROP: 3 SOLUTION OPHTHALMIC at 08:05

## 2022-12-01 RX ADMIN — TROPICAMIDE 1 DROP: 10 SOLUTION/ DROPS OPHTHALMIC at 08:05

## 2022-12-01 RX ADMIN — CIPROFLOXACIN 1 DROP: 3 SOLUTION OPHTHALMIC at 08:00

## 2022-12-01 ASSESSMENT — PAIN SCALES - GENERAL
PAINLEVEL_OUTOF10: 0

## 2022-12-01 ASSESSMENT — LIFESTYLE VARIABLES: SMOKING_STATUS: 0

## 2022-12-01 NOTE — OP NOTE
Carlee Trimble    OPERATIVE NOTE    Preoperative Diagnosis: Cataract right eye    Postoperative Diagnosis: Cataract right eye    Procedure: Phacoemulsification with intraocular lens implantation, right eye  Surgeon: Bhumi Velasquez MD    Anesthesia: MAC, topical.    Complications: none    Estimated blood loss: less than 1 ml. Specimens: none    Indications for procedure: The patient is a 74y.o. year old with decreased vision, glare and halos around lights, and trouble with activities of daily living. Examination revealed a visually significant cataract in the right eye. Risks, benefits, and alternatives to surgery were discussed with the patient and the patient elected to proceed with phacoemulsification with lens implantation. Details of the procedure: Following informed consent, the patient was taken to the operating room and placed in the supine position. Monitored anesthesia care was administered. The eye was prepped and draped in the usual sterile fashion using aseptic technique for cataract surgery. A side port incision was made. 1% preservative free lidocaine was injected through the side port incision for topical anesthesia. The eye was filled with viscoelastic and a 2.4 mm keratome blade was used to make a 3-plane clear corneal incision in the temporal cornea. The cystitome was used to make a tear in the anterior capsule and a Utrata forceps was used to make a complete curvilinear capsulorrhexis. The lens was hydrodissected and freely rotated. Phacoemulsification was performed. Irrigation/aspiration was used to remove all cortical material from the capsular bag. The eye was filled with viscoelastic, ORA was used to confirm lens power, and a foldable posterior chamber intraocular lens was injected into the capsular bag. The lens was rotated to the appropriate axis as needed. Irrigation/aspiration was used to remove all excess viscoelastic.   The eye was pressurized with BSS and the wounds were check for leaks and none were found. The patient had ofloxacin and Alphagan solutions placed on the eye. The patient went to the PACU in excellent condition, having tolerated the procedure well.

## 2022-12-01 NOTE — DISCHARGE INSTRUCTIONS
Post-Operative Instructions After Cataract Surgery  [facility]   Tequila Dinero M.D.    [phone]    {left/right:[id number]}    @ED@    Patient Name: Lonnie Deras  : 1950  MRN: [id number]      Wear your protective shield at bedtime and nap time for 1 week to prevent accidentally rubbing or bumping your eye. The post-operative drops are VERY IMPORTANT. Use them as directed on the drop schedule given to you the day of surgery. Do not lift over 25 lbs for the first week. DO NOT RUB YOUR EYE. You may wash your hair 24 hours after surgery, but avoid getting water in your eye for the first 5 days. Use a dry wash cloth to protect water from getting in your eye while taking a shower. No eye makeup for 1 week. You may return to work anytime provided your job does not require heavy lifting or straining. If you work in a kelsey environment, please take one week off work after surgery. CALL THE OFFICE IMMEDIATELY IF YOU EXPERIENCE ANY OF THE FOLLOWING                   [phone]  Veil or curtain coming across vision. Sudden decrease in vision. Shower of NEW floaters. Increase in pain. Flashes of light.

## 2022-12-01 NOTE — ANESTHESIA POSTPROCEDURE EVALUATION
Department of Anesthesiology  Postprocedure Note    Patient: Shabana Betts  MRN: [id number]  YOB: 1950  Date of evaluation: 12/1/2022      Procedure Summary     Date: 12/01/22 Room / Location: [address]    Anesthesia Start: 8721 Anesthesia Stop: 2158    Procedure: PHACOEMULSIFICATION WITH INTRAOCULAR LENS IMPLANT - RIGHT EYE, ORA (Right: Eye) Diagnosis:       Combined forms of age-related cataract of right eye      (Combined forms of age-related cataract of right eye)    Surgeons: John Zafar MD Responsible Provider: Wilton Vasquez MD    Anesthesia Type: MAC ASA Status: 2          Anesthesia Type: MAC    Gloria Phase I: Gloria Score: 10    Gloria Phase II: Gloria Score: 10      Anesthesia Post Evaluation    Patient location during evaluation: PACU  Patient participation: complete - patient participated  Level of consciousness: awake  Airway patency: patent  Nausea & Vomiting: no nausea and no vomiting  Complications: no  Cardiovascular status: hemodynamically stable and blood pressure returned to baseline  Respiratory status: spontaneous ventilation, nonlabored ventilation and room air  Hydration status: stable  Comments: Ms. Iam Dunn was seen resting comfortably following procedure. Appropriate for discharge home with .

## 2022-12-01 NOTE — ANESTHESIA PRE PROCEDURE
Department of Anesthesiology  Preprocedure Note       Name:  Heide Jewell   Age:  74 y.o.  :  1950                                          MRN:  [id number]         Date:  2022      Surgeon: Allie Currie):  Tawanna Ramsey MD    Procedure: Procedure(s):  PHACOEMULSIFICATION WITH INTRAOCULAR LENS IMPLANT - RIGHT EYE, ORA    Medications prior to admission:   Prior to Admission medications    Medication Sig Start Date End Date Taking?  Authorizing Provider   cetirizine (ZYRTEC) 10 MG tablet Take 10 mg by mouth daily  Patient not taking: Reported on 2022   Yes Historical Provider, MD   diclofenac sodium (VOLTAREN) 1 % GEL Apply topically 2 times daily   Yes Historical Provider, MD   NONFORMULARY CBD cream   Yes Historical Provider, MD   Cholecalciferol (VITAMIN D3) 125 MCG (5000 UT) TABS Take 1 tablet by mouth daily   Yes Historical Provider, MD   atorvastatin (LIPITOR) 40 MG tablet Take 40 mg by mouth daily   Yes Historical Provider, MD   psyllium (METAMUCIL) 0.52 g capsule Take 0.52 g by mouth daily    Historical Provider, MD   polyethylene glycol (GLYCOLAX) 17 GM/SCOOP powder Take 17 g by mouth daily    Historical Provider, MD   conjugated estrogens (PREMARIN) 0.625 MG/GM vaginal cream Place vaginally 20   Historical Provider, MD   triamcinolone (KENALOG) 0.025 % cream Apply topically as needed 3/2/21   Historical Provider, MD   Multiple Vitamins-Minerals (CENTRUM WOMEN) TABS Take 1 tablet by mouth daily    Historical Provider, MD   vitamin B-12 (CYANOCOBALAMIN) 500 MCG tablet Take 500 mcg by mouth daily chewable    Historical Provider, MD   levothyroxine (SYNTHROID) 88 MCG tablet Take 75 mcg by mouth Daily 10/5/20   Historical Provider, MD   NONFORMULARY Margarite Diones colon health capsule once a day  Patient not taking: No sig reported    Historical Provider, MD       Current medications:    Current Facility-Administered Medications   Medication Dose Route Frequency Provider Last Rate Last Admin  0.9 % sodium chloride infusion   IntraVENous Continuous Bean Pate  mL/hr at 12/01/22 0810 NoRateChange at 12/01/22 0810    sodium chloride flush 0.9 % injection 5-40 mL  5-40 mL IntraVENous 2 times per day Bean Pate MD        sodium chloride flush 0.9 % injection 5-40 mL  5-40 mL IntraVENous PRN Bean Pate MD        0.9 % sodium chloride infusion   IntraVENous PRN Bean Pate MD        povidone-iodine 5 % ophthalmic solution   Right Eye See [address] Instructions Virgil Carpenter MD   Given at 12/01/22 0800    tetracaine (TETRAVISC) 0.5 % ophthalmic solution 1 drop  1 drop Right Eye See Admin Instructions Virgil Carpenter MD   1 drop at 12/01/22 0800    sodium chloride flush 0.9 % injection 5-40 mL  5-40 mL IntraVENous 2 times per day Virgil Carpenter MD        sodium chloride flush 0.9 % injection 5-40 mL  5-40 mL IntraVENous PRN Virgil Carpenter MD        0.9 % sodium chloride infusion   IntraVENous PRN Virgil Carpenter MD           Allergies:     Allergies   Allergen Reactions    Cefaclor Shortness Of Breath     Other reaction(s): Difficulty breathing    Penicillins     Sulfa Antibiotics      Other reaction(s): Hives       Problem List:    Patient Active Problem List   Diagnosis Code    Postmenopause Z78.0    Herniation of rectum into vagina N81.6    Hyperlipidemia E78.5    Hypothyroidism E03.9    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    Onychomycosis B35.1    Osteoarthritis of both hands M19.041, M19.042    Osteopenia M85.80    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       Past Medical History:        Diagnosis Date    Cancer ([facility] [address]     removal of a cancerous node from left thigh    Colon polyps     Cystocele, midline     Enterocele     Herniation of rectum into vagina     Hyperlipidemia     borderline-no meds    Hypothyroidism     Inflammatory dermatosis     Onychomycosis     Osteoarthritis of both hands     Osteopenia     Overactive bladder     Papillary carcinoma in situ of breast     Post-menopause     Rectocele     Thyroid disease     underactive    Vaginal atrophy     Vaginal vault prolapse        Past Surgical History:        Procedure Laterality Date    APPENDECTOMY      BREAST LUMPECTOMY      benign cyst    BREAST SURGERY      cyst benign    CHOLECYSTECTOMY      COLONOSCOPY      HYSTERECTOMY (CERVIX STATUS UNKNOWN)  1990    fibroids, excessive bleeding    OVARY REMOVAL      RECTOCELE REPAIR      TONSILLECTOMY  1954    TUBAL LIGATION      VAGINA SURGERY N/A 03/15/2021    VAGINAL VAULT SUSPENSION, PARAVAGINAL REPAIR, POSTERIOR REPAIR, ENTEROCELE REPAIR, CYSTOSCOPY performed by Justina Cook MD at [address] [place] [address] History:    Social History     Tobacco Use    Smoking status: Never    Smokeless tobacco: Never   Substance Use Topics    Alcohol use: Yes     Comment: occasionally                                Counseling given: Not Answered      Vital Signs (Current):   Vitals:    11/22/22 1427 12/01/22 0807   BP:  (!) 143/70   Pulse:  61   Resp:  10   Temp:  96.9 °F (36.1 °C)   TempSrc:  Temporal   SpO2:  100%   Weight: 133 lb (60.3 kg) 133 lb (60.3 kg)   Height: 5' 7\" (1.702 m) 5' 7\" (1.702 m)                                              BP Readings from Last 3 Encounters:   12/01/22 (!) 143/70   06/09/21 128/75   04/28/21 (!) 155/81       NPO Status: Time of last liquid consumption: 2015                        Time of last solid consumption: 2015                        Date of last liquid consumption: 11/30/22                        Date of last solid food consumption: 11/30/22    BMI:   Wt Readings from Last 3 Encounters:   12/01/22 133 lb (60.3 kg)   03/15/21 134 lb 11.2 oz (61.1 kg)     Body mass index is 20.83 kg/m².     CBC:   Lab Results   Component Value Date/Time    WBC 5.3 03/15/2021 06:52 AM    RBC 4.51 03/15/2021 06:52 AM    HGB 13.2 03/15/2021 06:52 AM    HCT 39.5 03/15/2021 06:52 AM    MCV 87.6 03/15/2021 06:52 AM    RDW 13.5 03/15/2021 06:52 AM     03/15/2021 06:52 AM       CMP: No results found for: NA, K, CL, CO2, BUN, CREATININE, GFRAA, AGRATIO, LABGLOM, GLUCOSE, GLU, PROT, CALCIUM, BILITOT, ALKPHOS, AST, ALT    POC Tests: No results for input(s): POCGLU, POCNA, POCK, POCCL, POCBUN, POCHEMO, POCHCT in the last 72 hours. Coags: No results found for: PROTIME, INR, APTT    HCG (If Applicable): No results found for: PREGTESTUR, PREGSERUM, HCG, HCGQUANT     ABGs: No results found for: PHART, PO2ART, DWQ3MVE, RLJ1WBM, BEART, K8KAZGEM     Type & Screen (If Applicable):  No results found for: LABABO, LABRH    Drug/Infectious Status (If Applicable):  No results found for: HIV, HEPCAB    COVID-19 Screening (If Applicable):   Lab Results   Component Value Date/Time    COVID19 Not Detected 03/09/2021 04:08 PM           Anesthesia Evaluation   no history of anesthetic complications:   Airway: Mallampati: II  TM distance: >3 FB   Neck ROM: full  Mouth opening: > = 3 FB   Dental:      Comment: Denies loose teeth    Pulmonary:       (-) COPD, asthma, rhonchi, wheezes, rales and not a current smoker                           Cardiovascular:  Exercise tolerance: good (>4 METS),   (+) hyperlipidemia    (-) pacemaker, hypertension, valvular problems/murmurs, past MI, CAD, CABG/stent, dysrhythmias,  angina,  CHF, orthopnea, PND, peripheral edema and no pulmonary hypertension      Rhythm: regular  Rate: normal                    Neuro/Psych:      (-) seizures, TIA and CVA           GI/Hepatic/Renal:        (-) liver disease, no renal disease and no morbid obesity       Endo/Other:    (+) hypothyroidism: arthritis: OA., malignancy/cancer (h/o breast cancer; left thigh nodule). (-) diabetes mellitus, hyperthyroidism, blood dyscrasia, no electrolyte abnormalities               Abdominal:         (-) obese       Vascular: Other Findings: Well-appearing, very pleasant.              Anesthesia Plan      MAC     ASA 2     (NPO appropriate. Ms. Heather De La Paz denies active nausea / reflux.)        Anesthetic plan and risks discussed with patient. Plan discussed with CRNA. This pre-anesthesia assessment may be used as a history and physical.    DOS STAFF ADDENDUM:    Pt seen and examined, chart reviewed (including anesthesia, drug and allergy history). No interval changes to history and physical examination. Anesthetic plan, risks, benefits, alternatives, and personnel involved discussed with patient. Patient verbalized an understanding and agrees to proceed.       Justin Garcia MD  December 1, 2022  8:21 AM

## 2022-12-08 NOTE — PROGRESS NOTES
[facility] time___0920_________        Surgery time___1050_________    Take the following medications with a sip of water: Follow your MD/Surgeons pre-procedure instructions regarding your medications     Do not eat or drink anything after 12:00 midnight prior to your surgery. This includes water chewing gum, mints and ice chips. You may brush your teeth and gargle the morning of your surgery, but do not swallow the water     Please see your family doctor/pediatrician for a history and physical and/or concerning medications. Bring any test results/reports from your physicians office. If you are under the care of a heart doctor or specialist doctor, please be aware that you may be asked to them for clearance    You may be asked to stop blood thinners such as Coumadin, Plavix, Fragmin, Lovenox, etc., or any anti-inflammatories such as:  Aspirin, Ibuprofen, Advil, Naproxen prior to your surgery. We also ask that you stop any OTC medications such as fish oil, vitamin E, glucosamine, garlic, Multivitamins, COQ 10, etc.    We ask that you do not smoke 24 hours prior to surgery  We ask that you do not  drink any alcoholic beverages 24 hours prior to surgery     You must make arrangements for a responsible adult to take you home after your surgery. For your safety you will not be allowed to leave alone or drive yourself home. Your surgery will be cancelled if you do not have a ride home. Also for your safety, it is strongly suggested that someone stay with you the first 24 hours after your surgery. A parent or legal guardian must accompany a child scheduled for surgery and plan to stay at the hospital until the child is discharged. Please do not bring other children with you. For your comfort, please wear simple loose fitting clothing to the hospital.  Please do not bring valuables.     Do not wear any make-up or nail polish on your fingers or toes      For your safety, please do not wear any jewelry or body piercing's on the day of surgery. All jewelry must be removed. If you have dentures, they will be removed before going to operating room. For your convenience, we will provide you with a container. If you wear contact lenses or glasses, they will be removed, please bring a case for them. If you have a living will and a durable power of  for healthcare, please bring in a copy. As part of our patient safety program to minimize surgical site infections, we ask you to do the following:    Please notify your surgeon if you develop any illness between         now and the  day of your surgery. This includes a cough, cold, fever, sore throat, nausea,         or vomiting, and diarrhea, etc.   Please notify your surgeon if you experience dizziness, shortness         of breath or blurred vision between now and the time of your surgery. Do not shave your operative site 96 hours prior to surgery. For face and neck surgery, men may use an electric razor 48 hours   prior to surgery. You may shower the night before surgery or the morning of   your surgery with an antibacterial soap. You will need to bring a photo ID and insurance card    [facility] has an onsite pharmacy, would you like to utilize our pharmacy     If you will be staying overnight and use a C-pap machine, please bring   your C-pap to hospital     Our goal is to provide you with excellent care, therefore, visitors will be limited to two(2) in the room at a time so that we may focus on providing this care for you. Please contact pre-admission testing if you have any further questions. [facility] phone number:  [address] PAT fax number:  [phone]  Please note these are generalized instructions for all surgical cases, you may be provided with more specific instructions according to your surgery.     C-Difficile admission screening and protocol:       * Admitted with diarrhea? [] YES    [x]  NO     *Prior history of C-Diff. In last 3 months? [] YES    [x]  NO     *Antibiotic use in the past 6-8 weeks? [x]  NO    []  YES                 If yes, which ANTIBIOTIC AND REASON______     *Prior hospitalization or nursing home in the last month? []  YES    []  NO        SAFETY FIRST. .call before you fall

## 2022-12-13 NOTE — PROGRESS NOTES
Pre-Op Phone Call:    Patient Name: Lilliam Nayak    Phone number called: ____[phone]______JNQX    Phone number called: __________ home    Extended Message: no    Message left with:       Recent change in health status: no    Advised of transportation/ policy: YES    NPO policy reviewed:YES}, reviewed with: Advised to take morning heart/blood pressure medications with sips of water morning of surgery? YES}    Instructed to bring eye drops, photo identification, and insurance card day of surgery? YES    Advised to wear short sleeved button down shirt (no T-shirt underneath): YES    Advised not to wear jewelry, hairpins, or pantyhose day of surgery? YES    Advised not to wear make-up and to wash face day of surgery?  YES     Remarks:    Ina Rodríguez RN, 12/13/2022, 9:56 AM

## 2022-12-14 ENCOUNTER — ANESTHESIA EVENT (OUTPATIENT)
Dept: SURGERY | Age: 72
End: 2022-12-14
Payer: MEDICARE

## 2022-12-14 ENCOUNTER — PREP FOR PROCEDURE (OUTPATIENT)
Dept: OPHTHALMOLOGY | Age: 72
End: 2022-12-14

## 2022-12-14 RX ORDER — SODIUM CHLORIDE 9 MG/ML
INJECTION, SOLUTION INTRAVENOUS PRN
Status: CANCELLED | OUTPATIENT
Start: 2022-12-14

## 2022-12-14 RX ORDER — SODIUM CHLORIDE 0.9 % (FLUSH) 0.9 %
5-40 SYRINGE (ML) INJECTION EVERY 12 HOURS SCHEDULED
Status: CANCELLED | OUTPATIENT
Start: 2022-12-14

## 2022-12-14 RX ORDER — TROPICAMIDE 10 MG/ML
1 SOLUTION/ DROPS OPHTHALMIC SEE ADMIN INSTRUCTIONS
Status: CANCELLED | OUTPATIENT
Start: 2022-12-14

## 2022-12-14 RX ORDER — TETRACAINE HYDROCHLORIDE 5 MG/ML
1 SOLUTION OPHTHALMIC SEE ADMIN INSTRUCTIONS
Status: CANCELLED | OUTPATIENT
Start: 2022-12-14

## 2022-12-14 RX ORDER — SODIUM CHLORIDE 0.9 % (FLUSH) 0.9 %
5-40 SYRINGE (ML) INJECTION PRN
Status: CANCELLED | OUTPATIENT
Start: 2022-12-14

## 2022-12-14 RX ORDER — KETOROLAC TROMETHAMINE 5 MG/ML
1 SOLUTION OPHTHALMIC SEE ADMIN INSTRUCTIONS
Status: CANCELLED | OUTPATIENT
Start: 2022-12-14

## 2022-12-14 RX ORDER — CIPROFLOXACIN HYDROCHLORIDE 3.5 MG/ML
1 SOLUTION/ DROPS TOPICAL SEE ADMIN INSTRUCTIONS
Status: CANCELLED | OUTPATIENT
Start: 2022-12-14

## 2022-12-14 RX ORDER — PHENYLEPHRINE HCL 2.5 %
1 DROPS OPHTHALMIC (EYE) SEE ADMIN INSTRUCTIONS
Status: CANCELLED | OUTPATIENT
Start: 2022-12-14

## 2022-12-15 ENCOUNTER — ANESTHESIA (OUTPATIENT)
Dept: SURGERY | Age: 72
End: 2022-12-15
Payer: MEDICARE

## 2022-12-15 ENCOUNTER — HOSPITAL ENCOUNTER (OUTPATIENT)
Age: 72
Setting detail: OUTPATIENT SURGERY
Discharge: HOME | End: 2022-12-15
Attending: OPHTHALMOLOGY | Admitting: OPHTHALMOLOGY
Payer: MEDICARE

## 2022-12-15 VITALS
HEART RATE: 86 BPM | RESPIRATION RATE: 14 BRPM | WEIGHT: 128 LBS | OXYGEN SATURATION: 98 % | HEIGHT: 67 IN | DIASTOLIC BLOOD PRESSURE: 72 MMHG | TEMPERATURE: 97.8 F | SYSTOLIC BLOOD PRESSURE: 141 MMHG | BODY MASS INDEX: 20.09 KG/M2

## 2022-12-15 PROCEDURE — 3700000001 HC ADD 15 MINUTES (ANESTHESIA): Performed by: OPHTHALMOLOGY

## 2022-12-15 PROCEDURE — V2632 POST CHMBR INTRAOCULAR LENS: HCPCS | Performed by: OPHTHALMOLOGY

## 2022-12-15 PROCEDURE — 2580000003 HC RX 258: Performed by: ANESTHESIOLOGY

## 2022-12-15 PROCEDURE — 3700000000 HC ANESTHESIA ATTENDED CARE: Performed by: OPHTHALMOLOGY

## 2022-12-15 PROCEDURE — 3600000004 HC SURGERY LEVEL 4 BASE: Performed by: OPHTHALMOLOGY

## 2022-12-15 PROCEDURE — 2500000003 HC RX 250 WO HCPCS: Performed by: OPHTHALMOLOGY

## 2022-12-15 PROCEDURE — 3600000014 HC SURGERY LEVEL 4 ADDTL 15MIN: Performed by: OPHTHALMOLOGY

## 2022-12-15 PROCEDURE — 6370000000 HC RX 637 (ALT 250 FOR IP): Performed by: OPHTHALMOLOGY

## 2022-12-15 PROCEDURE — 2709999900 HC NON-CHARGEABLE SUPPLY: Performed by: OPHTHALMOLOGY

## 2022-12-15 PROCEDURE — 7100000010 HC PHASE II RECOVERY - FIRST 15 MIN: Performed by: OPHTHALMOLOGY

## 2022-12-15 PROCEDURE — 6360000002 HC RX W HCPCS

## 2022-12-15 DEVICE — IMPLANTABLE DEVICE: Type: IMPLANTABLE DEVICE | Site: ANTERIOR CHAMBER | Status: FUNCTIONAL

## 2022-12-15 RX ORDER — CIPROFLOXACIN HYDROCHLORIDE 3.5 MG/ML
1 SOLUTION/ DROPS TOPICAL SEE ADMIN INSTRUCTIONS
Status: DISCONTINUED | OUTPATIENT
Start: 2022-12-15 | End: 2022-12-15 | Stop reason: HOSPADM

## 2022-12-15 RX ORDER — SODIUM CHLORIDE 9 MG/ML
INJECTION, SOLUTION INTRAVENOUS PRN
Status: DISCONTINUED | OUTPATIENT
Start: 2022-12-15 | End: 2022-12-15 | Stop reason: HOSPADM

## 2022-12-15 RX ORDER — SODIUM CHLORIDE 0.9 % (FLUSH) 0.9 %
5-40 SYRINGE (ML) INJECTION PRN
Status: DISCONTINUED | OUTPATIENT
Start: 2022-12-15 | End: 2022-12-15 | Stop reason: HOSPADM

## 2022-12-15 RX ORDER — TETRACAINE HYDROCHLORIDE 5 MG/ML
1 SOLUTION OPHTHALMIC SEE ADMIN INSTRUCTIONS
Status: DISCONTINUED | OUTPATIENT
Start: 2022-12-15 | End: 2022-12-15 | Stop reason: HOSPADM

## 2022-12-15 RX ORDER — MIDAZOLAM HYDROCHLORIDE 1 MG/ML
INJECTION INTRAMUSCULAR; INTRAVENOUS PRN
Status: DISCONTINUED | OUTPATIENT
Start: 2022-12-15 | End: 2022-12-15 | Stop reason: SDUPTHER

## 2022-12-15 RX ORDER — PHENYLEPHRINE HCL 2.5 %
1 DROPS OPHTHALMIC (EYE) SEE ADMIN INSTRUCTIONS
Status: DISCONTINUED | OUTPATIENT
Start: 2022-12-15 | End: 2022-12-15 | Stop reason: HOSPADM

## 2022-12-15 RX ORDER — SODIUM CHLORIDE 0.9 % (FLUSH) 0.9 %
5-40 SYRINGE (ML) INJECTION EVERY 12 HOURS SCHEDULED
Status: DISCONTINUED | OUTPATIENT
Start: 2022-12-15 | End: 2022-12-15 | Stop reason: HOSPADM

## 2022-12-15 RX ORDER — SODIUM CHLORIDE 9 MG/ML
INJECTION, SOLUTION INTRAVENOUS CONTINUOUS
Status: DISCONTINUED | OUTPATIENT
Start: 2022-12-15 | End: 2022-12-15 | Stop reason: HOSPADM

## 2022-12-15 RX ORDER — BRIMONIDINE TARTRATE 2 MG/ML
SOLUTION/ DROPS OPHTHALMIC
Status: COMPLETED | OUTPATIENT
Start: 2022-12-15 | End: 2022-12-15

## 2022-12-15 RX ORDER — TETRACAINE HYDROCHLORIDE 5 MG/ML
SOLUTION OPHTHALMIC
Status: COMPLETED | OUTPATIENT
Start: 2022-12-15 | End: 2022-12-15

## 2022-12-15 RX ORDER — LIDOCAINE HYDROCHLORIDE 10 MG/ML
INJECTION, SOLUTION EPIDURAL; INFILTRATION; INTRACAUDAL; PERINEURAL
Status: COMPLETED | OUTPATIENT
Start: 2022-12-15 | End: 2022-12-15

## 2022-12-15 RX ORDER — BALANCED SALT SOLUTION 6.4; .75; .48; .3; 3.9; 1.7 MG/ML; MG/ML; MG/ML; MG/ML; MG/ML; MG/ML
SOLUTION OPHTHALMIC
Status: COMPLETED | OUTPATIENT
Start: 2022-12-15 | End: 2022-12-15

## 2022-12-15 RX ORDER — TROPICAMIDE 10 MG/ML
1 SOLUTION/ DROPS OPHTHALMIC SEE ADMIN INSTRUCTIONS
Status: DISCONTINUED | OUTPATIENT
Start: 2022-12-15 | End: 2022-12-15 | Stop reason: HOSPADM

## 2022-12-15 RX ORDER — KETOROLAC TROMETHAMINE 5 MG/ML
1 SOLUTION OPHTHALMIC SEE ADMIN INSTRUCTIONS
Status: COMPLETED | OUTPATIENT
Start: 2022-12-15 | End: 2022-12-15

## 2022-12-15 RX ORDER — OFLOXACIN 3 MG/ML
SOLUTION/ DROPS OPHTHALMIC
Status: COMPLETED | OUTPATIENT
Start: 2022-12-15 | End: 2022-12-15

## 2022-12-15 RX ADMIN — PHENYLEPHRINE HYDROCHLORIDE 1 DROP: 25 SOLUTION/ DROPS OPHTHALMIC at 09:51

## 2022-12-15 RX ADMIN — MIDAZOLAM 1 MG: 1 INJECTION INTRAMUSCULAR; INTRAVENOUS at 11:07

## 2022-12-15 RX ADMIN — TROPICAMIDE 1 DROP: 10 SOLUTION/ DROPS OPHTHALMIC at 09:49

## 2022-12-15 RX ADMIN — CIPROFLOXACIN 1 DROP: 3 SOLUTION OPHTHALMIC at 09:51

## 2022-12-15 RX ADMIN — SODIUM CHLORIDE: 9 INJECTION, SOLUTION INTRAVENOUS at 09:51

## 2022-12-15 RX ADMIN — TETRACAINE HYDROCHLORIDE 1 DROP: 5 SOLUTION OPHTHALMIC at 09:51

## 2022-12-15 RX ADMIN — PHENYLEPHRINE HYDROCHLORIDE 1 DROP: 25 SOLUTION/ DROPS OPHTHALMIC at 09:49

## 2022-12-15 RX ADMIN — MIDAZOLAM 1 MG: 1 INJECTION INTRAMUSCULAR; INTRAVENOUS at 11:12

## 2022-12-15 RX ADMIN — TROPICAMIDE 1 DROP: 10 SOLUTION/ DROPS OPHTHALMIC at 09:51

## 2022-12-15 RX ADMIN — KETOROLAC TROMETHAMINE 1 DROP: 5 SOLUTION/ DROPS OPHTHALMIC at 09:51

## 2022-12-15 ASSESSMENT — PAIN SCALES - GENERAL
PAINLEVEL_OUTOF10: 0
PAINLEVEL_OUTOF10: 0

## 2022-12-15 ASSESSMENT — LIFESTYLE VARIABLES: SMOKING_STATUS: 0

## 2022-12-15 ASSESSMENT — PAIN - FUNCTIONAL ASSESSMENT
PAIN_FUNCTIONAL_ASSESSMENT: 0-10
PAIN_FUNCTIONAL_ASSESSMENT: NONE - DENIES PAIN

## 2022-12-15 NOTE — ANESTHESIA PRE PROCEDURE
Department of Anesthesiology  Preprocedure Note       Name:  Alma Delia Gaona   Age:  74 y.o.  :  1950                                          MRN:  [id number]         Date:  12/15/2022      Surgeon: Damaris Maki):  Lu Lagunas MD    Procedure: Procedure(s):  PHACOEMULSIFICATION WITH INTRAOCULAR LENS IMPLANT - LEFT EYE, ORA    Medications prior to admission:   Prior to Admission medications    Medication Sig Start Date End Date Taking? Authorizing Provider   cetirizine (ZYRTEC) 10 MG tablet Take 10 mg by mouth daily  Patient not taking: No sig reported    Historical Provider, MD   diclofenac sodium (VOLTAREN) 1 % GEL Apply topically 2 times daily    Historical Provider, MD MARTIORMNKECHI CBD cream    Historical Provider, MD   Cholecalciferol (VITAMIN D3) 125 MCG (5000 UT) TABS Take 1 tablet by mouth daily    Historical Provider, MD   atorvastatin (LIPITOR) 40 MG tablet Take 40 mg by mouth daily    Historical Provider, MD   psyllium (METAMUCIL) 0.52 g capsule Take 0.52 g by mouth daily    Historical Provider, MD   polyethylene glycol (GLYCOLAX) 17 GM/SCOOP powder Take 17 g by mouth daily    Historical Provider, MD   conjugated estrogens (PREMARIN) 0.625 MG/GM vaginal cream Place vaginally 20   Historical Provider, MD   triamcinolone (KENALOG) 0.025 % cream Apply topically as needed 3/2/21   Historical Provider, MD   Multiple Vitamins-Minerals (CENTRUM WOMEN) TABS Take 1 tablet by mouth daily    Historical Provider, MD   vitamin B-12 (CYANOCOBALAMIN) 500 MCG tablet Take 500 mcg by mouth daily chewable    Historical Provider, MD   levothyroxine (SYNTHROID) 88 MCG tablet Take 75 mcg by mouth Daily 10/5/20   Historical Provider, MD L[place]E [place] [facility] health capsule once a day  Patient not taking: No sig reported    Historical Provider, MD       Current medications:    No current facility-administered medications for this visit. No current outpatient medications on file. Facility-Administered Medications Ordered in Other Visits   Medication Dose Route Frequency Provider Last Rate Last Admin    0.9 % sodium chloride infusion   IntraVENous Continuous Leopoldo Sacramento, MD 75 mL/hr at 12/15/22 0951 New Bag at 12/15/22 0951    sodium chloride flush 0.9 % injection 5-40 mL  5-40 mL IntraVENous 2 times per day Leopoldo Sacramento, MD        sodium chloride flush 0.9 % injection 5-40 mL  5-40 mL IntraVENous PRN Leopoldo Sacramento, MD        0.9 % sodium chloride infusion   IntraVENous PRN Leopoldo Sacramento, MD        povidone-iodine 5 % ophthalmic solution   Left Eye See [address] Instructions Mohsen Hinton MD        ciprofloxacin (CILOXAN) 0.3 % ophthalmic solution 1 drop  1 drop Left Eye See Admin Instructions Mohsen Hinton MD   1 drop at 12/15/22 0951    phenylephrine (MYDFRIN) 2.5 % ophthalmic solution 1 drop  1 drop Left Eye See Admin Instructions Mohsen Hinton MD   1 drop at 12/15/22 0951    tetracaine (TETRAVISC) 0.5 % ophthalmic solution 1 drop  1 drop Left Eye See Admin Instructions Mohsen Hinton MD   1 drop at 12/15/22 0951    tropicamide (MYDRIACYL) 1 % ophthalmic solution 1 drop  1 drop Left Eye See Admin Instructions Mohsen Hinton MD   1 drop at 12/15/22 0951    sodium chloride flush 0.9 % injection 5-40 mL  5-40 mL IntraVENous 2 times per day Mohsen Hinton MD        sodium chloride flush 0.9 % injection 5-40 mL  5-40 mL IntraVENous PRN Mohsen Hinton MD        0.9 % sodium chloride infusion   IntraVENous PRN Mohsen Hinton MD           Allergies:     Allergies   Allergen Reactions    Cefaclor Shortness Of Breath     Other reaction(s): Difficulty breathing    Penicillins     Sulfa Antibiotics      Other reaction(s): Hives       Problem List:    Patient Active Problem List   Diagnosis Code    Postmenopause Z78.0    Herniation of rectum into vagina N81.6    Hyperlipidemia E78.5    Hypothyroidism E03.9    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    Onychomycosis B35.1    Osteoarthritis of both hands M19.041, M19.042    Osteopenia M85.80    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       Past Medical History:        Diagnosis Date    Cancer ([address]     removal of a cancerous node from left thigh    Colon polyps     Cystocele, midline     Enterocele     Herniation of rectum into vagina     Hyperlipidemia     borderline-no meds    Hypothyroidism     Inflammatory dermatosis     Onychomycosis     Osteoarthritis of both hands     Osteopenia     Overactive bladder     Papillary carcinoma in situ of breast     Post-menopause     Rectocele     Thyroid disease     underactive    Vaginal atrophy     Vaginal vault prolapse        Past Surgical History:        Procedure Laterality Date    APPENDECTOMY      BREAST LUMPECTOMY      benign cyst    BREAST SURGERY      cyst benign    CHOLECYSTECTOMY      COLONOSCOPY      HYSTERECTOMY (CERVIX STATUS UNKNOWN)  1990    fibroids, excessive bleeding    INTRACAPSULAR CATARACT EXTRACTION Right 12/1/2022    PHACOEMULSIFICATION WITH INTRAOCULAR LENS IMPLANT - RIGHT EYE, ORA performed by Katja Boyer MD at [facility] N/A 03/15/2021    VAGINAL VAULT SUSPENSION, PARAVAGINAL REPAIR, POSTERIOR REPAIR, ENTEROCELE REPAIR, CYSTOSCOPY performed by Todd Matthews MD at [address] [place] [address] History:    Social History     Tobacco Use    Smoking status: Never    Smokeless tobacco: Never   Substance Use Topics    Alcohol use: Yes     Comment: occasionally                                Counseling given: Not Answered      Vital Signs (Current): There were no vitals filed for this visit.                                            BP Readings from Last 3 Encounters:   12/15/22 117/79   12/01/22 (!) 140/81   06/09/21 128/75       NPO Status: BMI:   Wt Readings from Last 3 Encounters:   12/15/22 128 lb (58.1 kg)   12/01/22 133 lb (60.3 kg)   03/15/21 134 lb 11.2 oz (61.1 kg)     There is no height or weight on file to calculate BMI.    CBC:   Lab Results   Component Value Date/Time    WBC 5.3 03/15/2021 06:52 AM    RBC 4.51 03/15/2021 06:52 AM    HGB 13.2 03/15/2021 06:52 AM    HCT 39.5 03/15/2021 06:52 AM    MCV 87.6 03/15/2021 06:52 AM    RDW 13.5 03/15/2021 06:52 AM     03/15/2021 06:52 AM       CMP: No results found for: NA, K, CL, CO2, BUN, CREATININE, GFRAA, AGRATIO, LABGLOM, GLUCOSE, GLU, PROT, CALCIUM, BILITOT, ALKPHOS, AST, ALT    POC Tests: No results for input(s): POCGLU, POCNA, POCK, POCCL, POCBUN, POCHEMO, POCHCT in the last 72 hours.     Coags: No results found for: PROTIME, INR, APTT    HCG (If Applicable): No results found for: PREGTESTUR, PREGSERUM, HCG, HCGQUANT     ABGs: No results found for: PHART, PO2ART, LAU5RCP, RRM9XAM, BEART, W2LVHOSO     Type & Screen (If Applicable):  No results found for: LABABO, LABRH    Drug/Infectious Status (If Applicable):  No results found for: HIV, HEPCAB    COVID-19 Screening (If Applicable):   Lab Results   Component Value Date/Time    COVID19 Not Detected 03/09/2021 04:08 PM           Anesthesia Evaluation  Patient summary reviewed no history of anesthetic complications:   Airway: Mallampati: II  TM distance: >3 FB   Neck ROM: full  Mouth opening: > = 3 FB   Dental:      Comment: Denies loose teeth    Pulmonary:       (-) COPD, asthma, rhonchi, wheezes, rales and not a current smoker                           Cardiovascular:  Exercise tolerance: good (>4 METS),   (+) hyperlipidemia    (-) pacemaker, hypertension, valvular problems/murmurs, past MI, CAD, CABG/stent, dysrhythmias,  angina,  CHF, orthopnea, PND, peripheral edema and no pulmonary hypertension      Rhythm: regular  Rate: normal                    Neuro/Psych:      (-) seizures, TIA and CVA           GI/Hepatic/Renal:        (-) liver disease, no renal disease and no morbid obesity       Endo/Other:    (+) hypothyroidism: arthritis: OA., malignancy/cancer (h/o breast cancer; left thigh nodule). (-) diabetes mellitus, hyperthyroidism, blood dyscrasia, no electrolyte abnormalities               Abdominal:         (-) obese       Vascular: Other Findings: Well-appearing, very pleasant. Anesthesia Plan      MAC     ASA 2     (Hx of hypothyroid, breast cancer, HLD presenting for phacoemulsification    Discussed risks and benefits to sedation including nausea, vomiting, allergic reaction, headache, delayed cognitive recovery, stroke, heart attack, respiratory depression, and death which patient understood and agreed to proceed. The patient was given the opportunity to ask questions and all questions were answered to the patient's satisfaction.  )  Induction: intravenous. Anesthetic plan and risks discussed with patient. Plan discussed with CRNA. This pre-anesthesia assessment may be used as a history and physical.    DOS STAFF ADDENDUM:    Pt seen and examined, chart reviewed (including anesthesia, drug and allergy history). No interval changes to history and physical examination. Anesthetic plan, risks, benefits, alternatives, and personnel involved discussed with patient. Patient verbalized an understanding and agrees to proceed.       Que Penny MD  December 15, 2022  10:01 AM

## 2022-12-15 NOTE — DISCHARGE INSTRUCTIONS
Post-Operative Instructions After Cataract Surgery  [facility]   Zahra Lugo M.D.    [phone]    {left/right:[id number]}    @ED@    Patient Name: Maryana Dillon  : 1950  MRN: [id number]      Wear your protective shield at bedtime and nap time for 1 week to prevent accidentally rubbing or bumping your eye. The post-operative drops are VERY IMPORTANT. Use them as directed on the drop schedule given to you the day of surgery. Do not lift over 25 lbs for the first week. DO NOT RUB YOUR EYE. You may wash your hair 24 hours after surgery, but avoid getting water in your eye for the first 5 days. Use a dry wash cloth to protect water from getting in your eye while taking a shower. No eye makeup for 1 week. You may return to work anytime provided your job does not require heavy lifting or straining. If you work in a k[employer] environment, please take one week off work after surgery. CALL THE OFFICE IMMEDIATELY IF YOU EXPERIENCE ANY OF THE FOLLOWING                   [phone]  Veil or curtain coming across vision. Sudden decrease in vision. Shower of NEW floaters. Increase in pain. Flashes of light.

## 2022-12-15 NOTE — OP NOTE
Annie Trimble    OPERATIVE NOTE    Preoperative Diagnosis: Cataract left eye    Postoperative Diagnosis: Cataract left eye    Procedure: Phacoemulsification with intraocular lens implantation, left eye  Surgeon: Cody Cardozo MD    Anesthesia: MAC, topical.    Complications: none    Estimated blood loss: less than 1 ml. Specimens: none    Indications for procedure: The patient is a 74y.o. year old with decreased vision, glare and halos around lights, and trouble with activities of daily living. Examination revealed a visually significant cataract in the left eye. Risks, benefits, and alternatives to surgery were discussed with the patient and the patient elected to proceed with phacoemulsification with lens implantation. Details of the procedure: Following informed consent, the patient was taken to the operating room and placed in the supine position. Monitored anesthesia care was administered. The eye was prepped and draped in the usual sterile fashion using aseptic technique for cataract surgery. A side port incision was made. 1% preservative free lidocaine was injected through the side port incision for topical anesthesia. The eye was filled with viscoelastic and a 2.4 mm keratome blade was used to make a 3-plane clear corneal incision in the temporal cornea. The cystitome was used to make a tear in the anterior capsule and a Utrata forceps was used to make a complete curvilinear capsulorrhexis. The lens was hydrodissected and freely rotated. Phacoemulsification was performed. Irrigation/aspiration was used to remove all cortical material from the capsular bag. The eye was filled with viscoelastic, ORA was used to confirm the lens power, and a foldable posterior chamber intraocular lens was injected into the capsular bag. The lens was rotated to the appropriate axis as needed. Irrigation/aspiration was used to remove all excess viscoelastic.   The eye was pressurized with BSS and the wounds were check for leaks and none were found. The patient had ofloxacin and Alphagan solutions placed on the eye. The patient went to the PACU in excellent condition, having tolerated the procedure well.

## 2022-12-15 NOTE — ANESTHESIA POSTPROCEDURE EVALUATION
Department of Anesthesiology  Postprocedure Note    Patient: Nusrat Arvizu  MRN: [id number]  YOB: 1950  Date of evaluation: 12/15/2022      Procedure Summary     Date: 12/15/22 Room / Location: [address]    Anesthesia Start: 4490 Anesthesia Stop: 3202    Procedure: PHACOEMULSIFICATION WITH INTRAOCULAR LENS IMPLANT - LEFT EYE, ORA (Left: Eye) Diagnosis:       Combined forms of age-related cataract of left eye      (Combined forms of age-related cataract of left eye)    Surgeons: Melanie Mitchell MD Responsible Provider: Kim Polo MD    Anesthesia Type: MAC ASA Status: 2          Anesthesia Type: No value filed. Gloria Phase I: Gloria Score: 10    Gloria Phase II: Gloria Score: 10      Anesthesia Post Evaluation    Patient location during evaluation: PACU  Patient participation: complete - patient participated  Level of consciousness: awake  Airway patency: patent  Nausea & Vomiting: no nausea and no vomiting  Cardiovascular status: blood pressure returned to baseline  Respiratory status: acceptable  Hydration status: stable  Comments: Vital signs stable  OK to discharge from Stage I post anesthesia care.   Care transferred from Anesthesiology department on discharge from perioperative area   Multimodal analgesia pain management approach

## (undated) DEVICE — SOLUTION IRRIG BSS ST 500ML

## (undated) DEVICE — SURGICAL PROC PACK SHT WEST V4

## (undated) DEVICE — GAUZE,PACKING STRIP,IODOFORM,2"X5YD,STRL: Brand: CURAD

## (undated) DEVICE — SYRINGE MED 30ML STD CLR PLAS LUERLOCK TIP N CTRL DISP

## (undated) DEVICE — DRAPE LAP 104X35X124IN BLU CTRL + FAB REINF ABD FEN

## (undated) DEVICE — SOLUTION PREP POVIDONE IOD FOR SKIN MUCOUS MEM PRIOR TO

## (undated) DEVICE — SOLUTION IRRIG 500ML STRL H2O NONPYROGENIC

## (undated) DEVICE — SUTURE VCRL SZ 2-0 L18IN ABSRB VLT L26MM SH 1/2 CIR J775D

## (undated) DEVICE — SUTURE VCRL SZ 0 L18IN ABSRB VLT L26MM CT-2 1/2 CIR J727D

## (undated) DEVICE — SOLUTION SCRB 4OZ 10% POVIDONE IOD ANTIMIC BTL

## (undated) DEVICE — Z DISCONTINUED BY MEDLINE USE 2711682 TRAY SKIN PREP DRY W/ PREM GLV

## (undated) DEVICE — SYRINGE TB 1ML NDL 25GA L0.625IN PLAS SLIP TIP CONVENTIONAL

## (undated) DEVICE — GOWN SIRUS NONREIN XL W/TWL: Brand: MEDLINE INDUSTRIES, INC.

## (undated) DEVICE — SYRINGE IRRIG 60ML SFT PLIABLE BLB EZ TO GRP 1 HND USE W/

## (undated) DEVICE — MAJOR SET UP: Brand: MEDLINE INDUSTRIES, INC.

## (undated) DEVICE — HOOK RETRCT L5MM E SHRP SELF RET SYS LONE STAR

## (undated) DEVICE — DRAPE,UNDERBUTTOCKS,PCH,STERILE: Brand: MEDLINE

## (undated) DEVICE — SYRINGE MED 10ML SLIP TIP BLNT FILL AND LUERLOCK DISP

## (undated) DEVICE — SYRINGE 20ML LL S/C 50

## (undated) DEVICE — Z DISCONTINUED USE 2131664 WIPE INSTR W3XL3IN NONLINTING

## (undated) DEVICE — PAD,ABDOMINAL,8"X7.5",STERILE,LF,1/PK: Brand: MEDLINE

## (undated) DEVICE — CATHETER F BLLN 5CC 16FR 2 W HYDRGEL COAT LESS TRAUM LUB

## (undated) DEVICE — SYRINGE MED 3ML CLR PLAS STD N CTRL LUERLOCK TIP DISP

## (undated) DEVICE — GLOVE SURG SZ 75 CRM LTX FREE POLYISOPRENE POLYMER BEAD ANTI

## (undated) DEVICE — INVIEW CLEAR LEGGINGS: Brand: CONVERTORS

## (undated) DEVICE — SUTURE VCRL SZ 2-0 L18IN ABSRB UD POLYGLACTIN 910 BRAID TIE J111T

## (undated) DEVICE — Device

## (undated) DEVICE — CATH URETH 24FR DOVER RED LTX

## (undated) DEVICE — SUTURE VCRL SZ 3-0 L27IN ABSRB UD L26MM SH 1/2 CIR J416H

## (undated) DEVICE — 3M™ STERI-DRAPE™ INCISE DRAPE 1050 (60CM X 45CM): Brand: STERI-DRAPE™

## (undated) DEVICE — RETRACTOR SURG W18.3XL32.5CM PLAS SELF RET W/ 2 CATH CLP

## (undated) DEVICE — Device: Brand: MEDEX

## (undated) DEVICE — TAPE DRESSING 36YDX.25IN COTTON TWL

## (undated) DEVICE — NEEDLE SPNL 22GA L2.5IN BLK QNCKE BVL DISP

## (undated) DEVICE — GAUZE,SPONGE,4"X4",16PLY,XRAY,STRL,LF: Brand: MEDLINE

## (undated) DEVICE — DRAINBAG,ANTI-REFLUX TOWER,L/F,2000ML,LL: Brand: MEDLINE

## (undated) DEVICE — GAUZE,PACKING STRIP,PLAIN,2"X5YD,STRL,LF: Brand: CURAD

## (undated) DEVICE — SPONGE,LAP,4"X18",XR,ST,5/PK,40PK/CS: Brand: MEDLINE INDUSTRIES, INC.

## (undated) DEVICE — SOLUTION IV IRRIG POUR BRL 0.9% SODIUM CHL 2F7124

## (undated) DEVICE — SYRINGE MED 10ML TRNSLUC BRL PLUNG BLK MRK POLYPR CTRL

## (undated) DEVICE — BANDAGE GZ W6INXL162IN UNIV NONADHESIVE WND GEN USE KERLIX